# Patient Record
Sex: FEMALE | Race: WHITE | NOT HISPANIC OR LATINO | Employment: UNEMPLOYED | URBAN - NONMETROPOLITAN AREA
[De-identification: names, ages, dates, MRNs, and addresses within clinical notes are randomized per-mention and may not be internally consistent; named-entity substitution may affect disease eponyms.]

---

## 2018-02-05 ENCOUNTER — HOSPITAL ENCOUNTER (EMERGENCY)
Facility: HOSPITAL | Age: 23
Discharge: HOME | End: 2018-02-05
Attending: EMERGENCY MEDICINE | Admitting: EMERGENCY MEDICINE

## 2018-02-05 VITALS
HEART RATE: 112 BPM | BODY MASS INDEX: 37.67 KG/M2 | OXYGEN SATURATION: 99 % | SYSTOLIC BLOOD PRESSURE: 120 MMHG | TEMPERATURE: 98.9 F | WEIGHT: 240 LBS | RESPIRATION RATE: 18 BRPM | HEIGHT: 67 IN | DIASTOLIC BLOOD PRESSURE: 60 MMHG

## 2018-02-05 DIAGNOSIS — J10.1 INFLUENZA A: Primary | ICD-10-CM

## 2018-02-05 LAB
FLUAV AG NPH QL: POSITIVE
FLUBV AG NPH QL IA: NEGATIVE
S PYO AG THROAT QL: NEGATIVE

## 2018-02-05 PROCEDURE — 87880 STREP A ASSAY W/OPTIC: CPT | Performed by: EMERGENCY MEDICINE

## 2018-02-05 PROCEDURE — 87081 CULTURE SCREEN ONLY: CPT | Performed by: EMERGENCY MEDICINE

## 2018-02-05 PROCEDURE — 99283 EMERGENCY DEPT VISIT LOW MDM: CPT

## 2018-02-05 PROCEDURE — 87804 INFLUENZA ASSAY W/OPTIC: CPT | Performed by: EMERGENCY MEDICINE

## 2018-02-05 PROCEDURE — 63710000001 PROMETHAZINE PER 25 MG: Performed by: PHYSICIAN ASSISTANT

## 2018-02-05 RX ORDER — PROMETHAZINE HYDROCHLORIDE 25 MG/1
25 TABLET ORAL ONCE
Status: COMPLETED | OUTPATIENT
Start: 2018-02-05 | End: 2018-02-05

## 2018-02-05 RX ORDER — PROMETHAZINE HYDROCHLORIDE 12.5 MG/1
12.5 TABLET ORAL EVERY 8 HOURS PRN
Qty: 21 TABLET | Refills: 0 | Status: SHIPPED | OUTPATIENT
Start: 2018-02-05 | End: 2018-02-12

## 2018-02-05 RX ADMIN — PROMETHAZINE HYDROCHLORIDE 25 MG: 25 TABLET ORAL at 22:43

## 2018-02-06 NOTE — ED PROVIDER NOTES
Subjective   Patient is a 22 y.o. female presenting with flu symptoms.   Flu Symptoms   Presenting symptoms: cough, fatigue, fever, headache, myalgias and sore throat    Presenting symptoms: no diarrhea, no nausea, no rhinorrhea, no shortness of breath and no vomiting    Severity:  Moderate  Onset quality:  Gradual  Duration:  3 days  Progression:  Worsening  Chronicity:  New  Relieved by:  None tried  Worsened by:  Nothing  Ineffective treatments:  None tried  Associated symptoms: chills and decreased appetite    Associated symptoms: no decrease in physical activity, no ear pain, no mental status change, no congestion, no neck stiffness and no syncope    Risk factors: not elderly, no diabetes problem, no heart disease, no immunocompromised state, no kidney disease, no liver disease, not pregnant and no sick contacts        Review of Systems   Constitutional: Positive for chills, decreased appetite, fatigue and fever. Negative for activity change, appetite change, diaphoresis and unexpected weight change.   HENT: Positive for sore throat. Negative for congestion, dental problem, drooling, ear discharge, ear pain, facial swelling, hearing loss, mouth sores, nosebleeds, postnasal drip, rhinorrhea, sinus pain, sinus pressure, sneezing, tinnitus, trouble swallowing and voice change.    Respiratory: Positive for cough. Negative for shortness of breath.    Cardiovascular: Negative.  Negative for chest pain.   Gastrointestinal: Negative.  Negative for abdominal pain, diarrhea, nausea and vomiting.   Endocrine: Negative.    Genitourinary: Negative.  Negative for dysuria.   Musculoskeletal: Positive for myalgias. Negative for arthralgias, back pain, gait problem, joint swelling, neck pain and neck stiffness.   Skin: Negative.    Neurological: Positive for headaches. Negative for dizziness, tremors, seizures, syncope, facial asymmetry, speech difficulty, weakness, light-headedness and numbness.   Psychiatric/Behavioral:  Negative.    All other systems reviewed and are negative.      History reviewed. No pertinent past medical history.    No Known Allergies    History reviewed. No pertinent surgical history.    History reviewed. No pertinent family history.    Social History     Social History   • Marital status: Single     Spouse name: N/A   • Number of children: N/A   • Years of education: N/A     Social History Main Topics   • Smoking status: Never Smoker   • Smokeless tobacco: None   • Alcohol use None   • Drug use: None   • Sexual activity: Not Asked     Other Topics Concern   • None     Social History Narrative   • None           Objective   Physical Exam   Constitutional: She is oriented to person, place, and time. She appears well-developed and well-nourished. No distress.   HENT:   Head: Normocephalic and atraumatic.   Right Ear: Hearing, tympanic membrane, external ear and ear canal normal.   Left Ear: Hearing, tympanic membrane, external ear and ear canal normal.   Nose: Nose normal.   Mouth/Throat: Oropharynx is clear and moist. No oropharyngeal exudate.   Eyes: Conjunctivae and EOM are normal. Pupils are equal, round, and reactive to light.   Neck: Normal range of motion. Neck supple. No JVD present. No tracheal deviation present.   Cardiovascular: Normal rate, regular rhythm and normal heart sounds.    No murmur heard.  Pulmonary/Chest: Effort normal and breath sounds normal. No respiratory distress. She has no wheezes.   Abdominal: Soft. Bowel sounds are normal. There is no tenderness.   Musculoskeletal: Normal range of motion. She exhibits no edema or deformity.   Neurological: She is alert and oriented to person, place, and time. No cranial nerve deficit.   Skin: Skin is warm and dry. No rash noted. She is not diaphoretic. No erythema. No pallor.   Psychiatric: She has a normal mood and affect. Her behavior is normal. Thought content normal.   Nursing note and vitals reviewed.      Procedures         ED Course  ED  Course   Comment By Time   Patient diagnosed with influenza A. Please increase fluid intake and use phenergan for nausea. Will f/u with PCP in 3 days. Will return to ER if symptoms worsen. AMADOU Mena 02/05 [id number]                  MDM  Number of Diagnoses or Management Options  Influenza A:      Amount and/or Complexity of Data Reviewed  Clinical lab tests: ordered and reviewed        Final diagnoses:   Influenza A            AMADOU Mena  02/06/18 0231

## 2018-02-06 NOTE — DISCHARGE INSTRUCTIONS
Please increase fluid intake and use tylenol and ibuprofen as needed. Use phenergan as needed for nausea. Please see PCP In 3 days. Please return to ER if symptoms worsen.   Influenza, Adult  Influenza, more commonly known as “the flu,” is a viral infection that primarily affects the respiratory tract. The respiratory tract includes organs that help you breathe, such as the lungs, nose, and throat. The flu causes many common cold symptoms, as well as a high fever and body aches.  The flu spreads easily from person to person (is contagious). Getting a flu shot (influenza vaccination) every year is the best way to prevent influenza.  What are the causes?  Influenza is caused by a virus. You can catch the virus by:  · Breathing in droplets from an infected person's cough or sneeze.  · Touching something that was recently contaminated with the virus and then touching your mouth, nose, or eyes.  What increases the risk?  The following factors may make you more likely to get the flu:  · Not cleaning your hands frequently with soap and water or alcohol-based hand .  · Having close contact with many people during cold and flu season.  · Touching your mouth, eyes, or nose without washing or sanitizing your hands first.  · Not drinking enough fluids or not eating a healthy diet.  · Not getting enough sleep or exercise.  · Being under a high amount of stress.  · Not getting a yearly (annual) flu shot.  You may be at a higher risk of complications from the flu, such as a severe lung infection (pneumonia), if you:  · Are over the age of 65.  · Are pregnant.  · Have a weakened disease-fighting system (immune system). You may have a weakened immune system if you:  ¨ Have HIV or AIDS.  ¨ Are undergoing chemotherapy.  ¨ Are taking medicines that reduce the activity of (suppress) the immune system.  · Have a long-term (chronic) illness, such as heart disease, kidney disease, diabetes, or lung disease.  · Have a liver  disorder.  · Are obese.  · Have anemia.  What are the signs or symptoms?  Symptoms of this condition typically last 4-10 days and may include:  · Fever.  · Chills.  · Headache, body aches, or muscle aches.  · Sore throat.  · Cough.  · Runny or congested nose.  · Chest discomfort and cough.  · Poor appetite.  · Weakness or tiredness (fatigue).  · Dizziness.  · Nausea or vomiting.  How is this diagnosed?  This condition may be diagnosed based on your medical history and a physical exam. Your health care provider may do a nose or throat swab test to confirm the diagnosis.  How is this treated?  If influenza is detected early, you can be treated with antiviral medicine that can reduce the length of your illness and the severity of your symptoms. This medicine may be given by mouth (orally) or through an IV tube that is inserted in one of your veins.  The goal of treatment is to relieve symptoms by taking care of yourself at home. This may include taking over-the-counter medicines, drinking plenty of fluids, and adding humidity to the air in your home.  In some cases, influenza goes away on its own. Severe influenza or complications from influenza may be treated in a hospital.  Follow these instructions at home:  · Take over-the-counter and prescription medicines only as told by your health care provider.  · Use a cool mist humidifier to add humidity to the air in your home. This can make breathing easier.  · Rest as needed.  · Drink enough fluid to keep your urine clear or pale yellow.  · Cover your mouth and nose when you cough or sneeze.  · Wash your hands with soap and water often, especially after you cough or sneeze. If soap and water are not available, use hand .  · Stay home from work or school as told by your health care provider. Unless you are visiting your health care provider, try to avoid leaving home until your fever has been gone for 24 hours without the use of medicine.  · Keep all follow-up  visits as told by your health care provider. This is important.  How is this prevented?  · Getting an annual flu shot is the best way to avoid getting the flu. You may get the flu shot in late summer, fall, or winter. Ask your health care provider when you should get your flu shot.  · Wash your hands often or use hand  often.  · Avoid contact with people who are sick during cold and flu season.  · Eat a healthy diet, drink plenty of fluids, get enough sleep, and exercise regularly.  Contact a health care provider if:  · You develop new symptoms.  · You have:  ¨ Chest pain.  ¨ Diarrhea.  ¨ A fever.  · Your cough gets worse.  · You produce more mucus.  · You feel nauseous or you vomit.  Get help right away if:  · You develop shortness of breath or difficulty breathing.  · Your skin or nails turn a bluish color.  · You have severe pain or stiffness in your neck.  · You develop a sudden headache or sudden pain in your face or ear.  · You cannot stop vomiting.  This information is not intended to replace advice given to you by your health care provider. Make sure you discuss any questions you have with your health care provider.  Document Released: 12/15/2001 Document Revised: 05/25/2017 Document Reviewed: 10/11/2016  [employer] Interactive Patient Education © 2017 [employer]      Hypertension  Hypertension, commonly called high blood pressure, is when the force of blood pumping through your arteries is too strong. Your arteries are the blood vessels that carry blood from your heart throughout your body. A blood pressure reading consists of a higher number over a lower number, such as 110/72. The higher number (systolic) is the pressure inside your arteries when your heart pumps. The lower number (diastolic) is the pressure inside your arteries when your heart relaxes. Ideally you want your blood pressure below 120/80.  Hypertension forces your heart to work harder to pump blood. Your arteries may become narrow  or stiff. Having untreated or uncontrolled hypertension can cause heart attack, stroke, kidney disease, and other problems.  What increases the risk?  Some risk factors for high blood pressure are controllable. Others are not.  Risk factors you cannot control include:  · Race. You may be at higher risk if you are .  · Age. Risk increases with age.  · Gender. Men are at higher risk than women before age 45 years. After age 65, women are at higher risk than men.  Risk factors you can control include:  · Not getting enough exercise or physical activity.  · Being overweight.  · Getting too much fat, sugar, calories, or salt in your diet.  · Drinking too much alcohol.  What are the signs or symptoms?  Hypertension does not usually cause signs or symptoms. Extremely high blood pressure (hypertensive crisis) may cause headache, anxiety, shortness of breath, and nosebleed.  How is this diagnosed?  To check if you have hypertension, your health care provider will measure your blood pressure while you are seated, with your arm held at the level of your heart. It should be measured at least twice using the same arm. Certain conditions can cause a difference in blood pressure between your right and left arms. A blood pressure reading that is higher than normal on one occasion does not mean that you need treatment. If it is not clear whether you have high blood pressure, you may be asked to return on a different day to have your blood pressure checked again. Or, you may be asked to monitor your blood pressure at home for 1 or more weeks.  How is this treated?  Treating high blood pressure includes making lifestyle changes and possibly taking medicine. Living a healthy lifestyle can help lower high blood pressure. You may need to change some of your habits.  Lifestyle changes may include:  · Following the DASH diet. This diet is high in fruits, vegetables, and whole grains. It is low in salt, red meat, and added  sugars.  · Keep your sodium intake below 2,300 mg per day.  · Getting at least 30-45 minutes of aerobic exercise at least 4 times per week.  · Losing weight if necessary.  · Not smoking.  · Limiting alcoholic beverages.  · Learning ways to reduce stress.  Your health care provider may prescribe medicine if lifestyle changes are not enough to get your blood pressure under control, and if one of the following is true:  · You are 18-59 years of age and your systolic blood pressure is above 140.  · You are 60 years of age or older, and your systolic blood pressure is above 150.  · Your diastolic blood pressure is above 90.  · You have diabetes, and your systolic blood pressure is over 140 or your diastolic blood pressure is over 90.  · You have kidney disease and your blood pressure is above 140/90.  · You have heart disease and your blood pressure is above 140/90.  Your personal target blood pressure may vary depending on your medical conditions, your age, and other factors.  Follow these instructions at home:  · Have your blood pressure rechecked as directed by your health care provider.  · Take medicines only as directed by your health care provider. Follow the directions carefully. Blood pressure medicines must be taken as prescribed. The medicine does not work as well when you skip doses. Skipping doses also puts you at risk for problems.  · Do not smoke.  · Monitor your blood pressure at home as directed by your health care provider.  Contact a health care provider if:  · You think you are having a reaction to medicines taken.  · You have recurrent headaches or feel dizzy.  · You have swelling in your ankles.  · You have trouble with your vision.  Get help right away if:  · You develop a severe headache or confusion.  · You have unusual weakness, numbness, or feel faint.  · You have severe chest or abdominal pain.  · You vomit repeatedly.  · You have trouble breathing.  This information is not intended to replace  advice given to you by your health care provider. Make sure you discuss any questions you have with your health care provider.  Document Released: 12/18/2006 Document Revised: 05/25/2017 Document Reviewed: 10/10/2014  [employer] Interactive Patient Education © 2017 [employer]

## 2018-02-07 LAB — BACTERIA SPEC AEROBE CULT: NORMAL

## 2020-06-04 ENCOUNTER — APPOINTMENT (OUTPATIENT)
Dept: ULTRASOUND IMAGING | Facility: HOSPITAL | Age: 25
End: 2020-06-04

## 2020-06-04 ENCOUNTER — HOSPITAL ENCOUNTER (INPATIENT)
Facility: HOSPITAL | Age: 25
LOS: 2 days | Discharge: HOME | End: 2020-06-06
Attending: EMERGENCY MEDICINE | Admitting: OBSTETRICS & GYNECOLOGY

## 2020-06-04 DIAGNOSIS — A49.9 UTI (URINARY TRACT INFECTION), BACTERIAL: Primary | ICD-10-CM

## 2020-06-04 DIAGNOSIS — Z3A.08 8 WEEKS GESTATION OF PREGNANCY: ICD-10-CM

## 2020-06-04 DIAGNOSIS — E86.0 DEHYDRATION: ICD-10-CM

## 2020-06-04 DIAGNOSIS — R11.2 INTRACTABLE VOMITING WITH NAUSEA, UNSPECIFIED VOMITING TYPE: ICD-10-CM

## 2020-06-04 DIAGNOSIS — N39.0 UTI (URINARY TRACT INFECTION), BACTERIAL: Primary | ICD-10-CM

## 2020-06-04 PROBLEM — O23.41 URINARY TRACT INFECTION IN MOTHER DURING FIRST TRIMESTER OF PREGNANCY: Status: ACTIVE | Noted: 2020-06-04

## 2020-06-04 PROBLEM — O21.0 MILD HYPEREMESIS GRAVIDARUM: Status: ACTIVE | Noted: 2020-06-04

## 2020-06-04 LAB
ALBUMIN SERPL-MCNC: 4.14 G/DL (ref 3.5–5.2)
ALBUMIN/GLOB SERPL: 1.1 G/DL
ALP SERPL-CCNC: 93 U/L (ref 39–117)
ALT SERPL W P-5'-P-CCNC: 74 U/L (ref 1–33)
AMYLASE SERPL-CCNC: 41 U/L (ref 28–100)
ANION GAP SERPL CALCULATED.3IONS-SCNC: 11.6 MMOL/L (ref 5–15)
AST SERPL-CCNC: 48 U/L (ref 1–32)
BACTERIA UR QL AUTO: ABNORMAL /HPF
BASOPHILS # BLD AUTO: 0.03 10*3/MM3 (ref 0–0.2)
BASOPHILS NFR BLD AUTO: 0.2 % (ref 0–1.5)
BILIRUB SERPL-MCNC: 0.7 MG/DL (ref 0.2–1.2)
BILIRUB UR QL STRIP: ABNORMAL
BUN BLD-MCNC: 10 MG/DL (ref 6–20)
BUN/CREAT SERPL: 16.1 (ref 7–25)
CALCIUM SPEC-SCNC: 9.9 MG/DL (ref 8.6–10.5)
CHLORIDE SERPL-SCNC: 103 MMOL/L (ref 98–107)
CLARITY UR: ABNORMAL
CO2 SERPL-SCNC: 22.4 MMOL/L (ref 22–29)
COLOR UR: ABNORMAL
CREAT BLD-MCNC: 0.62 MG/DL (ref 0.57–1)
D-LACTATE SERPL-SCNC: 1 MMOL/L (ref 0.5–2)
DEPRECATED RDW RBC AUTO: 41.7 FL (ref 37–54)
EOSINOPHIL # BLD AUTO: 0.03 10*3/MM3 (ref 0–0.4)
EOSINOPHIL NFR BLD AUTO: 0.2 % (ref 0.3–6.2)
ERYTHROCYTE [DISTWIDTH] IN BLOOD BY AUTOMATED COUNT: 13.4 % (ref 12.3–15.4)
GFR SERPL CREATININE-BSD FRML MDRD: 119 ML/MIN/1.73
GLOBULIN UR ELPH-MCNC: 3.8 GM/DL
GLUCOSE BLD-MCNC: 98 MG/DL (ref 65–99)
GLUCOSE UR STRIP-MCNC: NEGATIVE MG/DL
HCG INTACT+B SERPL-ACNC: NORMAL MIU/ML
HCT VFR BLD AUTO: 43.9 % (ref 34–46.6)
HGB BLD-MCNC: 14.2 G/DL (ref 12–15.9)
HGB UR QL STRIP.AUTO: NEGATIVE
HYALINE CASTS UR QL AUTO: ABNORMAL /LPF
IMM GRANULOCYTES # BLD AUTO: 0.05 10*3/MM3 (ref 0–0.05)
IMM GRANULOCYTES NFR BLD AUTO: 0.4 % (ref 0–0.5)
KETONES UR QL STRIP: ABNORMAL
LEUKOCYTE ESTERASE UR QL STRIP.AUTO: ABNORMAL
LIPASE SERPL-CCNC: 35 U/L (ref 13–60)
LYMPHOCYTES # BLD AUTO: 1.8 10*3/MM3 (ref 0.7–3.1)
LYMPHOCYTES NFR BLD AUTO: 13.8 % (ref 19.6–45.3)
MCH RBC QN AUTO: 28 PG (ref 26.6–33)
MCHC RBC AUTO-ENTMCNC: 32.3 G/DL (ref 31.5–35.7)
MCV RBC AUTO: 86.6 FL (ref 79–97)
MONOCYTES # BLD AUTO: 0.69 10*3/MM3 (ref 0.1–0.9)
MONOCYTES NFR BLD AUTO: 5.3 % (ref 5–12)
NEUTROPHILS # BLD AUTO: 10.43 10*3/MM3 (ref 1.7–7)
NEUTROPHILS NFR BLD AUTO: 80.1 % (ref 42.7–76)
NITRITE UR QL STRIP: POSITIVE
NRBC BLD AUTO-RTO: 0 /100 WBC (ref 0–0.2)
PH UR STRIP.AUTO: 6 [PH] (ref 5–8)
PLATELET # BLD AUTO: 291 10*3/MM3 (ref 140–450)
PMV BLD AUTO: 9.6 FL (ref 6–12)
POTASSIUM BLD-SCNC: 3.6 MMOL/L (ref 3.5–5.2)
PROT SERPL-MCNC: 7.9 G/DL (ref 6–8.5)
PROT UR QL STRIP: ABNORMAL
RBC # BLD AUTO: 5.07 10*6/MM3 (ref 3.77–5.28)
RBC # UR: ABNORMAL /HPF
REF LAB TEST METHOD: ABNORMAL
SODIUM BLD-SCNC: 137 MMOL/L (ref 136–145)
SP GR UR STRIP: >1.03 (ref 1–1.03)
SQUAMOUS #/AREA URNS HPF: ABNORMAL /HPF
UROBILINOGEN UR QL STRIP: ABNORMAL
WBC NRBC COR # BLD: 13.03 10*3/MM3 (ref 3.4–10.8)
WBC UR QL AUTO: ABNORMAL /HPF
WHOLE BLOOD HOLD SPECIMEN: NORMAL

## 2020-06-04 PROCEDURE — 76801 OB US < 14 WKS SINGLE FETUS: CPT

## 2020-06-04 PROCEDURE — 25010000002 PROMETHAZINE PER 50 MG: Performed by: EMERGENCY MEDICINE

## 2020-06-04 PROCEDURE — 76801 OB US < 14 WKS SINGLE FETUS: CPT | Performed by: RADIOLOGY

## 2020-06-04 PROCEDURE — 83690 ASSAY OF LIPASE: CPT | Performed by: EMERGENCY MEDICINE

## 2020-06-04 PROCEDURE — 84702 CHORIONIC GONADOTROPIN TEST: CPT | Performed by: EMERGENCY MEDICINE

## 2020-06-04 PROCEDURE — 82150 ASSAY OF AMYLASE: CPT | Performed by: EMERGENCY MEDICINE

## 2020-06-04 PROCEDURE — 25010000002 PROMETHAZINE PER 50 MG: Performed by: OBSTETRICS & GYNECOLOGY

## 2020-06-04 PROCEDURE — 99284 EMERGENCY DEPT VISIT MOD MDM: CPT

## 2020-06-04 PROCEDURE — 83605 ASSAY OF LACTIC ACID: CPT | Performed by: EMERGENCY MEDICINE

## 2020-06-04 PROCEDURE — 85025 COMPLETE CBC W/AUTO DIFF WBC: CPT | Performed by: EMERGENCY MEDICINE

## 2020-06-04 PROCEDURE — 25010000002 CEFTRIAXONE PER 250 MG: Performed by: EMERGENCY MEDICINE

## 2020-06-04 PROCEDURE — 36415 COLL VENOUS BLD VENIPUNCTURE: CPT

## 2020-06-04 PROCEDURE — 87040 BLOOD CULTURE FOR BACTERIA: CPT | Performed by: EMERGENCY MEDICINE

## 2020-06-04 PROCEDURE — 81001 URINALYSIS AUTO W/SCOPE: CPT | Performed by: EMERGENCY MEDICINE

## 2020-06-04 PROCEDURE — 80053 COMPREHEN METABOLIC PANEL: CPT | Performed by: EMERGENCY MEDICINE

## 2020-06-04 RX ORDER — NITROFURANTOIN 25; 75 MG/1; MG/1
100 CAPSULE ORAL 2 TIMES DAILY
COMMUNITY
End: 2020-06-06 | Stop reason: HOSPADM

## 2020-06-04 RX ORDER — LANOLIN ALCOHOL/MO/W.PET/CERES
25 CREAM (GRAM) TOPICAL 3 TIMES DAILY PRN
Status: DISCONTINUED | OUTPATIENT
Start: 2020-06-04 | End: 2020-06-06 | Stop reason: HOSPADM

## 2020-06-04 RX ORDER — PROMETHAZINE HYDROCHLORIDE 25 MG/1
25 TABLET ORAL EVERY 4 HOURS PRN
Status: CANCELLED | OUTPATIENT
Start: 2020-06-04

## 2020-06-04 RX ORDER — PYRIDOXINE HCL (VITAMIN B6) 25 MG
25 TABLET ORAL 3 TIMES DAILY PRN
Status: ON HOLD | COMMUNITY
End: 2020-11-19

## 2020-06-04 RX ORDER — NITROFURANTOIN 25; 75 MG/1; MG/1
100 CAPSULE ORAL EVERY 12 HOURS SCHEDULED
Status: CANCELLED | OUTPATIENT
Start: 2020-06-04 | End: 2020-06-09

## 2020-06-04 RX ORDER — SODIUM CHLORIDE 9 MG/ML
125 INJECTION, SOLUTION INTRAVENOUS CONTINUOUS
Status: DISCONTINUED | OUTPATIENT
Start: 2020-06-04 | End: 2020-06-06 | Stop reason: HOSPADM

## 2020-06-04 RX ORDER — PROMETHAZINE HYDROCHLORIDE 25 MG/1
25 TABLET ORAL EVERY 4 HOURS PRN
Status: ON HOLD | COMMUNITY
End: 2020-11-19

## 2020-06-04 RX ADMIN — PROMETHAZINE HYDROCHLORIDE 12.5 MG: 25 INJECTION INTRAMUSCULAR; INTRAVENOUS at 20:28

## 2020-06-04 RX ADMIN — SODIUM CHLORIDE 2000 ML: 9 INJECTION, SOLUTION INTRAVENOUS at 12:02

## 2020-06-04 RX ADMIN — SODIUM CHLORIDE 125 ML/HR: 9 INJECTION, SOLUTION INTRAVENOUS at 12:04

## 2020-06-04 RX ADMIN — SODIUM CHLORIDE 125 ML/HR: 9 INJECTION, SOLUTION INTRAVENOUS at 17:53

## 2020-06-04 RX ADMIN — PROMETHAZINE HYDROCHLORIDE 12.5 MG: 25 INJECTION INTRAMUSCULAR; INTRAVENOUS at 12:03

## 2020-06-04 RX ADMIN — SODIUM CHLORIDE 2 G: 900 INJECTION INTRAVENOUS at 15:16

## 2020-06-04 NOTE — H&P
Chief complaint   Chief Complaint   Patient presents with   • Abdominal Pain       History of Present Illness: Patient is a 25 y.o. female who presents with IUP at 8 weeks 4 days gestation. G2 P 1. Hyperemesis. UTI.        Past Medical History:   Diagnosis Date   • Hypertension     At age 18   • Kidney stone    • Urinary tract infection      Blood Type: A Rh Positive     Past Surgical History:   Procedure Laterality Date   • KIDNEY STONE SURGERY  2014    Kidney stone removal   • LAPAROSCOPIC CHOLECYSTECTOMY     • TONSILLECTOMY       Family History   Problem Relation Age of Onset   • Ovarian cancer Paternal Grandmother    • Hypertension Maternal Grandmother    • Breast cancer Paternal Aunt      Social History     Tobacco Use   • Smoking status: Never Smoker   • Smokeless tobacco: Never Used   Substance Use Topics   • Alcohol use: Never     Frequency: Never   • Drug use: Never     Medications Prior to Admission   Medication Sig Dispense Refill Last Dose   • doxylamine ([employer]) 25 MG tablet Take 25 mg by mouth At Night As Needed for Sleep.   6/3/2020 at Unknown time   • nitrofurantoin, macrocrystal-monohydrate, (MACROBID) 100 MG capsule Take 100 mg by mouth 2 (Two) Times a Day. Prior to [facility] Admission, Patient was on: for 7 days, 9 capsules left. For UTI   6/4/2020 at Unknown time   • promethazine (PHENERGAN) 25 MG tablet Take 25 mg by mouth Every 4 (Four) Hours As Needed for Nausea or Vomiting.   6/4/2020 at Unknown time   • pyridoxine (VITAMIN B-6) 25 MG tablet Take 25 mg by mouth 3 (Three) Times a Day As Needed.   6/4/2020 at Unknown time     Allergies:  Patient has no known allergies.      Vital Signs  Temp:  [98.1 °F (36.7 °C)-98.2 °F (36.8 °C)] 98.1 °F (36.7 °C)  Heart Rate:  [] 76  Resp:  [18-20] 18  BP: (103-147)/(57-84) 147/79    Radiology  Imaging Results (Last 24 Hours)     Procedure Component Value Units Date/Time     Ob < 14 Weeks Single or First Gestation [[id number]] Collected:  06/04/20  1211     Updated:  06/04/20 1215    Narrative:       EXAMINATION: US OB < 14 WEEKS SINGLE OR FIRST GESTATION-      CLINICAL INDICATION: pregnant/lower abd pain        COMPARISON: None available     FINDINGS: Transabdominal sonographic imaging of the pelvis shows a  gestational sac and crown-rump length.     Heart rate was calculated at 166 bpm     Estimated age based on crown-rump length measurement was 8 weeks and 4  days.     Corpus luteum cyst in the left adnexa       Impression:       Appearance suggestive of single living intrauterine  pregnancy 8 weeks 4 days      This report was finalized on 6/4/2020 12:12 PM by Dr. John Valenzuela MD.             Labs  Lab Results (last 24 hours)     Procedure Component Value Units Date/Time    Blood Culture - Blood, Arm, Right [[id number]] Collected:  06/04/20 1431    Specimen:  Blood from Arm, Right Updated:  06/04/20 1435    Lactic Acid, Plasma [[id number]]  (Normal) Collected:  06/04/20 1347    Specimen:  Blood from Arm, Right Updated:  06/04/20 1412     Lactate 1.0 mmol/L     Blood Culture - Blood, Arm, Right [[id number]] Collected:  06/04/20 1347    Specimen:  Blood from Arm, Right Updated:  06/04/20 1355    Urinalysis, Microscopic Only - Urine, Clean Catch [[id number]]  (Abnormal) Collected:  06/04/20 1251    Specimen:  Urine, Clean Catch Updated:  06/04/20 1324     RBC, UA 0-2 /HPF      WBC, UA Too Numerous to Count /HPF      Bacteria, UA 2+ /HPF      Squamous Epithelial Cells, UA 13-20 /HPF      Hyaline Casts, UA 13-20 /LPF      Methodology Manual Light Microscopy    Urinalysis With Microscopic If Indicated (No Culture) - Urine, Clean Catch [[id number]]  (Abnormal) Collected:  06/04/20 1251    Specimen:  Urine, Clean Catch Updated:  06/04/20 1310     Color, UA Dark Yellow     Appearance, UA Cloudy     pH, UA 6.0     Specific Gravity, UA >1.030     Glucose, UA Negative     Ketones, UA >=160 mg/dL (4+)     Bilirubin, UA Small (1+)     Blood, UA Negative     Protein, UA 30  mg/dL (1+)     Leuk Esterase, UA Large (3+)     Nitrite, UA Positive     Urobilinogen, UA 1.0 E.U./dL    [place] Draw [[id number]] Collected:  06/04/20 1144    Specimen:  Blood from Arm, Right Updated:  06/04/20 1245    Narrative:       The following orders were created for panel order [place] Draw.  Procedure                               Abnormality         Status                     ---------                               -----------         ------                     Light Blue Top[[id number]]                                   Final result                 Please view results for these tests on the individual orders.    Light Blue Top [[id number]] Collected:  06/04/20 1144    Specimen:  Blood from Arm, Right Updated:  06/04/20 1245     Extra Tube hold for add-on     Comment: Auto resulted       hCG, Quantitative, Pregnancy [[id number]] Collected:  06/04/20 1144    Specimen:  Blood from Arm, Left Updated:  06/04/20 1241     HCG Quantitative 80,598.00 mIU/mL     Narrative:       HCG Ranges by Gestational Age    Females - non-pregnant premenopausal   </= 1mIU/mL HCG  Females - postmenopausal               </= 7mIU/mL HCG    3 Weeks         5.8 -    71.2 mIU/mL  4 Weeks         9.5 -     750 mIU/mL  5 Weeks         217 -   7,138 mIU/mL  6 Weeks         158 -  31,795 mIU/mL  7 Weeks       3,697 - 163,563 mIU/mL  8 Weeks      32,065 - 149,571 mIU/mL  9 Weeks      63,803 - 151,410 mIU/mL  10 Weeks     46,509 - 186,977 mIU/mL  12 Weeks     27,832 - 210,612 mIU/mL  14 Weeks     13,950 -  62,530 mIU/mL  15 Weeks     12,039 -  70,971 mIU/mL  16 Weeks      9,040 -  56,451 mIU/mL  17 Weeks      8,175 -  55,868 mIU/mL  18 Weeks      8,099 -  58,176 mIU/mL  Results may be falsely decreased if patient taking Biotin.      Comprehensive Metabolic Panel [[id number]]  (Abnormal) Collected:  06/04/20 1144    Specimen:  Blood from Arm, Left Updated:  06/04/20 1212     Glucose 98 mg/dL      BUN 10 mg/dL      Creatinine 0.62 mg/dL       Sodium 137 mmol/L      Potassium 3.6 mmol/L      Chloride 103 mmol/L      CO2 22.4 mmol/L      Calcium 9.9 mg/dL      Total Protein 7.9 g/dL      Albumin 4.14 g/dL      ALT (SGPT) 74 U/L      AST (SGOT) 48 U/L      Alkaline Phosphatase 93 U/L      Total Bilirubin 0.7 mg/dL      eGFR Non African Amer 119 mL/min/1.73      Globulin 3.8 gm/dL      A/G Ratio 1.1 g/dL      BUN/Creatinine Ratio 16.1     Anion Gap 11.6 mmol/L     Narrative:       GFR Normal >60  Chronic Kidney Disease <60  Kidney Failure <15      Lipase [[id number]]  (Normal) Collected:  06/04/20 1144    Specimen:  Blood from Arm, Left Updated:  06/04/20 1212     Lipase 35 U/L     Amylase [[id number]]  (Normal) Collected:  06/04/20 1144    Specimen:  Blood from Arm, Left Updated:  06/04/20 1212     Amylase 41 U/L     CBC & Differential [[id number]] Collected:  06/04/20 1144    Specimen:  Blood from Arm, Left Updated:  06/04/20 1150    Narrative:       The following orders were created for panel order CBC & Differential.  Procedure                               Abnormality         Status                     ---------                               -----------         ------                     CBC Auto Differential[[id number]]        Abnormal            Final result                 Please view results for these tests on the individual orders.    CBC Auto Differential [[id number]]  (Abnormal) Collected:  06/04/20 1144    Specimen:  Blood from Arm, Left Updated:  06/04/20 1150     WBC 13.03 10*3/mm3      RBC 5.07 10*6/mm3      Hemoglobin 14.2 g/dL      Hematocrit 43.9 %      MCV 86.6 fL      MCH 28.0 pg      MCHC 32.3 g/dL      RDW 13.4 %      RDW-SD 41.7 fl      MPV 9.6 fL      Platelets 291 10*3/mm3      Neutrophil % 80.1 %      Lymphocyte % 13.8 %      Monocyte % 5.3 %      Eosinophil % 0.2 %      Basophil % 0.2 %      Immature Grans % 0.4 %      Neutrophils, Absolute 10.43 10*3/mm3      Lymphocytes, Absolute 1.80 10*3/mm3      Monocytes, Absolute 0.69 10*3/mm3         Eosinophils, Absolute 0.03 10*3/mm3      Basophils, Absolute 0.03 10*3/mm3      Immature Grans, Absolute 0.05 10*3/mm3      nRBC 0.0 /100 WBC             Review of Systems    The following systems were reviewed and negative;  ENT, respiratory, cardiovascular, breast, endocrine and allergies / immunologic.      Physical Exam:      General Appearance:    Alert, cooperative, in no acute distress   Head:    Normocephalic, without obvious abnormality, atraumatic   Eyes:            Lids and lashes normal, conjunctivae and sclerae normal, no   icterus, no pallor, corneas clear, PERRLA   Ears:    Ears appear intact with no abnormalities noted   Throat:   No oral lesions, no thrush, oral mucosa moist   Neck:   No adenopathy, supple, trachea midline, no thyromegaly, no     carotid bruit, no JVD   Back:     No kyphosis present, no scoliosis present, no skin lesions,       erythema or scars, no tenderness to percussion or                   palpation,   range of motion normal   Lungs:     Clear to auscultation,respirations regular, even and                   unlabored    Heart:    Regular rhythm and normal rate, normal S1 and S2, no            murmur, no gallop, no rub, no click   Breast Exam:    Deferred   Abdomen:     Normal bowel sounds, no masses, no organomegaly, soft        non-tender, non-distended, no guarding, no rebound                 tenderness   Genitalia:    Deferred   Extremities:   Moves all extremities well, no edema, no cyanosis, no              redness   Pulses:   Pulses palpable and equal bilaterally   Skin:   No bleeding, bruising or rash   Lymph nodes:   No palpable adenopathy   Neurologic:   Cranial nerves 2 - 12 grossly intact, sensation intact, DTR        present and equal bilaterally         Assessment: Patient is a 25 y.o. female who presents with IUP at 8 weeks 4 days gestation. G 2 P1. UTI Hyperemesis.  Chief Complaint   Patient presents with   • Abdominal Pain       Plan of Care: Admit. Proceed  with IV fluids. IV antibiotics.       Hua Asencio III, MD  06/04/20  18:05

## 2020-06-04 NOTE — PLAN OF CARE
Problem: Patient Care Overview  Goal: Plan of Care Review  Outcome: Ongoing (interventions implemented as appropriate)  Flowsheets (Taken 6/4/2020 1612)  Progress: improving  Plan of Care Reviewed With: patient  Outcome Summary: The patient is tolerating Gatorade and ice chips. IV infusing well. No complaints at this time.

## 2020-06-04 NOTE — ED NOTES
Called [facility] to fax over US report. Mera will work on it and call us back.      Rene Jeffery  06/04/20 1133

## 2020-06-04 NOTE — ED NOTES
Patient is attempting to provide a urine sample at this time.      Symes, Heather  06/04/20 8984

## 2020-06-04 NOTE — ED PROVIDER NOTES
Subjective   Patient is a 25-year-old female G2, P1, states 8 weeks pregnant.  She reports that last Thursday, 8 days ago, she developed nausea and vomiting that has been intractable.  She states that she has been unable to keep anything down, not even her medications.  She reports only an occasional mild twinge of lower abdominal discomfort, no sustained pain of any sort, no vaginal bleeding.  She denies fever, chills, headaches, chest pain, shortness of breath, back pain, syncope or near syncope, any other symptoms or complaints.  She states that she went to [facility] on Saturday, received IV fluids, was diagnosed with a UTI and placed on Macrobid.  She states that she followed up at her OB office on Monday.  She presents today complaining that she is still unable to keep anything down, including her doxylamine, pyridoxine, Phenergan and Macrobid.          Review of Systems   Constitutional: Negative for chills, diaphoresis and fever.   HENT: Negative for ear pain, sore throat and trouble swallowing.    Eyes: Negative for photophobia and pain.   Respiratory: Negative for shortness of breath, wheezing and stridor.    Cardiovascular: Negative for chest pain and palpitations.   Gastrointestinal: Positive for nausea and vomiting.   Endocrine: Negative for polydipsia and polyphagia.   Genitourinary: Negative for difficulty urinating and flank pain.   Musculoskeletal: Negative for back pain, neck pain and neck stiffness.   Skin: Negative for color change and pallor.   Neurological: Negative for seizures, syncope and speech difficulty.   Psychiatric/Behavioral: Negative for confusion.   All other systems reviewed and are negative.      No past medical history on file.    No Known Allergies    No past surgical history on file.    No family history on file.    Social History     Socioeconomic History   • Marital status:      Spouse name: Not on file   • Number of children: Not on file   • Years of education:  Not on file   • Highest education level: Not on file   Tobacco Use   • Smoking status: Never Smoker           Objective   Physical Exam   Constitutional: She is oriented to person, place, and time. She appears well-developed and well-nourished.  Non-toxic appearance. No distress.   HENT:   Head: Normocephalic and atraumatic.   Eyes: Pupils are equal, round, and reactive to light. EOM are normal. No scleral icterus.   Neck: Normal range of motion. Neck supple. No neck rigidity. No tracheal deviation present.   Cardiovascular: Normal rate, regular rhythm and intact distal pulses.   Pulmonary/Chest: Effort normal and breath sounds normal. No respiratory distress. She exhibits no tenderness.   Abdominal: Soft. Bowel sounds are normal. There is no tenderness. There is no rebound, no guarding and no CVA tenderness.   Musculoskeletal: Normal range of motion. She exhibits no tenderness.   Neurological: She is alert and oriented to person, place, and time. She has normal strength. No sensory deficit. She exhibits normal muscle tone. Coordination normal. GCS eye subscore is 4. GCS verbal subscore is 5. GCS motor subscore is 6.   Skin: Skin is warm and dry. Capillary refill takes less than 2 seconds. No cyanosis. No pallor.   Psychiatric: She has a normal mood and affect. Her behavior is normal.   Nursing note and vitals reviewed.      Procedures  US Ob < 14 Weeks Single or First Gestation   Final Result   Appearance suggestive of single living intrauterine   pregnancy 8 weeks 4 days        This report was finalized on 6/4/2020 12:12 PM by Dr. John Valenzuela MD.            Results for orders placed or performed during the hospital encounter of 06/04/20   Comprehensive Metabolic Panel   Result Value Ref Range    Glucose 98 65 - 99 mg/dL    BUN 10 6 - 20 mg/dL    Creatinine 0.62 0.57 - 1.00 mg/dL    Sodium 137 136 - 145 mmol/L    Potassium 3.6 3.5 - 5.2 mmol/L    Chloride 103 98 - 107 mmol/L    CO2 22.4 22.0 - 29.0 mmol/L     Calcium 9.9 8.6 - 10.5 mg/dL    Total Protein 7.9 6.0 - 8.5 g/dL    Albumin 4.14 3.50 - 5.20 g/dL    ALT (SGPT) 74 (H) 1 - 33 U/L    AST (SGOT) 48 (H) 1 - 32 U/L    Alkaline Phosphatase 93 39 - 117 U/L    Total Bilirubin 0.7 0.2 - 1.2 mg/dL    eGFR Non African Amer 119 >60 mL/min/1.73    Globulin 3.8 gm/dL    A/G Ratio 1.1 g/dL    BUN/Creatinine Ratio 16.1 7.0 - 25.0    Anion Gap 11.6 5.0 - 15.0 mmol/L   Lipase   Result Value Ref Range    Lipase 35 13 - 60 U/L   hCG, Quantitative, Pregnancy   Result Value Ref Range    HCG Quantitative 80,598.00 mIU/mL   Amylase   Result Value Ref Range    Amylase 41 28 - 100 U/L   Urinalysis With Microscopic If Indicated (No Culture) - Urine, Clean Catch   Result Value Ref Range    Color, UA Dark Yellow (A) Yellow, Straw    Appearance, UA Cloudy (A) Clear    pH, UA 6.0 5.0 - 8.0    Specific Gravity, UA >1.030 (H) 1.005 - 1.030    Glucose, UA Negative Negative    Ketones, UA >=160 mg/dL (4+) (A) Negative    Bilirubin, UA Small (1+) (A) Negative    Blood, UA Negative Negative    Protein, UA 30 mg/dL (1+) (A) Negative    Leuk Esterase, UA Large (3+) (A) Negative    Nitrite, UA Positive (A) Negative    Urobilinogen, UA 1.0 E.U./dL 0.2 - 1.0 E.U./dL   CBC Auto Differential   Result Value Ref Range    WBC 13.03 (H) 3.40 - 10.80 10*3/mm3    RBC 5.07 3.77 - 5.28 10*6/mm3    Hemoglobin 14.2 12.0 - 15.9 g/dL    Hematocrit 43.9 34.0 - 46.6 %    MCV 86.6 79.0 - 97.0 fL    MCH 28.0 26.6 - 33.0 pg    MCHC 32.3 31.5 - 35.7 g/dL    RDW 13.4 12.3 - 15.4 %    RDW-SD 41.7 37.0 - 54.0 fl    MPV 9.6 6.0 - 12.0 fL    Platelets 291 140 - 450 10*3/mm3    Neutrophil % 80.1 (H) 42.7 - 76.0 %    Lymphocyte % 13.8 (L) 19.6 - 45.3 %    Monocyte % 5.3 5.0 - 12.0 %    Eosinophil % 0.2 (L) 0.3 - 6.2 %    Basophil % 0.2 0.0 - 1.5 %    Immature Grans % 0.4 0.0 - 0.5 %    Neutrophils, Absolute 10.43 (H) 1.70 - 7.00 10*3/mm3    Lymphocytes, Absolute 1.80 0.70 - 3.10 10*3/mm3    Monocytes, Absolute 0.69 0.10 - 0.90  10*3/mm3    Eosinophils, Absolute 0.03 0.00 - 0.40 10*3/mm3    Basophils, Absolute 0.03 0.00 - 0.20 10*3/mm3    Immature Grans, Absolute 0.05 0.00 - 0.05 10*3/mm3    nRBC 0.0 0.0 - 0.2 /100 WBC   Urinalysis, Microscopic Only - Urine, Clean Catch   Result Value Ref Range    RBC, UA 0-2 None Seen, 0-2 /HPF    WBC, UA Too Numerous to Count (A) None Seen, 0-2 /HPF    Bacteria, UA 2+ (A) None Seen /HPF    Squamous Epithelial Cells, UA 13-20 (A) None Seen, 0-2 /HPF    Hyaline Casts, UA 13-20 None Seen /LPF    Methodology Manual Light Microscopy    Light Blue Top   Result Value Ref Range    Extra Tube hold for add-on                 ED Course  ED Course as of Jun 04 1351   Thu Jun 04, 2020   1338 Patient's emergency department stay has been uncomplicated.  She has shown no signs of distress.  Currently she voices that she is resting comfortably and starting to feel better.  I discussed the case with Dr. Asencio, who is covering for Dr. Liz.  He is admitting patient to the women's health floor under Dr. Liz with him covering.  He advises start IV Rocephin.    [CM]      ED Course User Index  [CM] Po Ptit MD                                           [facility]    Final diagnoses:   UTI (urinary tract infection), bacterial   Dehydration   Intractable vomiting with nausea, unspecified vomiting type   8 weeks gestation of pregnancy             Please note that portions of this note were completed with a voice recognition program.        Po Pitt MD  06/04/20 0577

## 2020-06-05 PROCEDURE — 25010000002 CEFTRIAXONE PER 250 MG: Performed by: OBSTETRICS & GYNECOLOGY

## 2020-06-05 PROCEDURE — 25010000002 PROMETHAZINE PER 50 MG: Performed by: OBSTETRICS & GYNECOLOGY

## 2020-06-05 RX ORDER — PANTOPRAZOLE SODIUM 40 MG/1
40 TABLET, DELAYED RELEASE ORAL
Status: DISCONTINUED | OUTPATIENT
Start: 2020-06-06 | End: 2020-06-06 | Stop reason: HOSPADM

## 2020-06-05 RX ADMIN — SODIUM CHLORIDE 125 ML/HR: 9 INJECTION, SOLUTION INTRAVENOUS at 10:27

## 2020-06-05 RX ADMIN — SODIUM CHLORIDE 125 ML/HR: 9 INJECTION, SOLUTION INTRAVENOUS at 01:51

## 2020-06-05 RX ADMIN — PROMETHAZINE HYDROCHLORIDE 12.5 MG: 25 INJECTION INTRAMUSCULAR; INTRAVENOUS at 05:20

## 2020-06-05 RX ADMIN — CEFTRIAXONE 2 G: 2 INJECTION, POWDER, FOR SOLUTION INTRAMUSCULAR; INTRAVENOUS at 08:13

## 2020-06-05 RX ADMIN — PROMETHAZINE HYDROCHLORIDE 12.5 MG: 25 INJECTION INTRAMUSCULAR; INTRAVENOUS at 15:19

## 2020-06-05 RX ADMIN — SODIUM CHLORIDE 125 ML/HR: 9 INJECTION, SOLUTION INTRAVENOUS at 18:44

## 2020-06-05 NOTE — PAYOR COMM NOTE
"[facility]  NPI:[id number]    Utilization Review  Contact: Ana Maria Bueno RN  Phone: [phone]  Fax:[phone]    INITIATE INPATIENT AUTHORIZATION  NOTICE OF ADMISSION  ICD: E86.0   R11.10   N39.0    Kenia Pollack (25 y.o. Female)     Date of Birth Social Security Number Address Home Phone MRN    1995  [address] [place] [id number] [phone] [id number]    Yarsani Marital Status          Nondenominational        Admission Date Admission Type Admitting Provider Attending Provider Department, Room/Bed    6/4/20 Emergency Heidy Liz DO Ascani, Enrico III, MD [facility] PEDIATRICS, P247/1S    Discharge Date Discharge Disposition Discharge Destination                       Attending Provider:  Hua Asencio III, MD    Allergies:  No Known Allergies    Isolation:  None   Infection:  None   Code Status:  Not on file    Ht:  170.2 cm (67\")   Wt:  103 kg (228 lb)    Admission Cmt:  None   Principal Problem:  None                Active Insurance as of 6/4/2020     Primary Coverage     Payor Plan Insurance Group Employer/Plan Group    [employer] [facility]      Payor Plan Address Payor Plan Phone Number Payor Plan Fax Number Effective Dates    [address] [address]   7/1/2016 - None Entered    [place] [address]       Subscriber Name Subscriber Birth Date Member ID       KENIA POLLACK 1995 [id number]                 Emergency Contacts      (Rel.) Home Phone Work Phone Mobile Phone    Anny Pollack (Mother) [phone] -- --    Everardo Mcdonald (Spouse) [phone] -- --              "

## 2020-06-05 NOTE — PROGRESS NOTES
"Subjective  Still having quite a lot of nausea.  She is throwing up several times through the night as well.  She has a little bit of pain in her right lower quadrant.         Objective     /63   Pulse 78   Temp 97.8 °F (36.6 °C) (Oral)   Resp 18   Ht 170.2 cm (67\")   Wt 103 kg (228 lb)   LMP 2020   SpO2 98%   BMI 35.71 kg/m²   General:  alert, appears stated age and cooperative   Abdomen: soft, bowel sounds active, non-tender, no CVA tenderness   extremities:   Nontender no edema         Assessment   25-year-old  2 para 1 at 9 weeks  Hyperemesis gravidarum-continue IV fluids and anti-medics  She does have a heartburn component so we will start some Protonix.  Urinary tract infection     Plan    Continue IV fluids, antiemetics, antibiotics.  Start Protonix  "

## 2020-06-05 NOTE — PLAN OF CARE
Problem: Patient Care Overview  Goal: Plan of Care Review  Outcome: Ongoing (interventions implemented as appropriate)  Flowsheets (Taken 6/5/2020 8493)  Progress: improving  Plan of Care Reviewed With: patient  Outcome Summary: The patient has complaints of nausea and bouts of emesis. Voiding well. IV infusing. Phenergan given as needed.

## 2020-06-06 VITALS
RESPIRATION RATE: 16 BRPM | WEIGHT: 228 LBS | TEMPERATURE: 98.4 F | DIASTOLIC BLOOD PRESSURE: 72 MMHG | BODY MASS INDEX: 35.79 KG/M2 | OXYGEN SATURATION: 98 % | HEIGHT: 67 IN | HEART RATE: 85 BPM | SYSTOLIC BLOOD PRESSURE: 121 MMHG

## 2020-06-06 PROCEDURE — 25010000002 PROMETHAZINE PER 50 MG: Performed by: OBSTETRICS & GYNECOLOGY

## 2020-06-06 PROCEDURE — 25010000002 CEFTRIAXONE PER 250 MG: Performed by: OBSTETRICS & GYNECOLOGY

## 2020-06-06 RX ORDER — FOLIC ACID 1 MG/1
1 TABLET ORAL DAILY
Qty: 30 TABLET | Refills: 9 | Status: ON HOLD | OUTPATIENT
Start: 2020-06-06 | End: 2020-11-19

## 2020-06-06 RX ORDER — CEPHALEXIN 500 MG/1
500 CAPSULE ORAL 2 TIMES DAILY
Qty: 14 CAPSULE | Refills: 0 | Status: ON HOLD | OUTPATIENT
Start: 2020-06-06 | End: 2020-11-19

## 2020-06-06 RX ORDER — PROMETHAZINE HYDROCHLORIDE 12.5 MG/1
12.5 TABLET ORAL EVERY 6 HOURS PRN
Qty: 30 TABLET | Refills: 1 | Status: ON HOLD | OUTPATIENT
Start: 2020-06-06 | End: 2020-11-19

## 2020-06-06 RX ORDER — PANTOPRAZOLE SODIUM 40 MG/1
40 TABLET, DELAYED RELEASE ORAL DAILY
Qty: 30 TABLET | Refills: 1 | Status: ON HOLD | OUTPATIENT
Start: 2020-06-06 | End: 2020-12-09

## 2020-06-06 RX ORDER — METOCLOPRAMIDE 10 MG/1
10 TABLET ORAL EVERY 6 HOURS PRN
Qty: 30 TABLET | Refills: 1 | Status: ON HOLD | OUTPATIENT
Start: 2020-06-06 | End: 2020-11-28

## 2020-06-06 RX ADMIN — PANTOPRAZOLE SODIUM 40 MG: 40 TABLET, DELAYED RELEASE ORAL at 06:49

## 2020-06-06 RX ADMIN — CEFTRIAXONE 2 G: 2 INJECTION, POWDER, FOR SOLUTION INTRAMUSCULAR; INTRAVENOUS at 08:24

## 2020-06-06 RX ADMIN — SODIUM CHLORIDE 125 ML/HR: 9 INJECTION, SOLUTION INTRAVENOUS at 02:44

## 2020-06-06 RX ADMIN — PROMETHAZINE HYDROCHLORIDE 12.5 MG: 25 INJECTION INTRAMUSCULAR; INTRAVENOUS at 06:47

## 2020-06-06 NOTE — PLAN OF CARE
Vital signs stable, no signs of distress noted. Currently during this shift no nausea or vomiting noted.

## 2020-06-06 NOTE — PLAN OF CARE
Problem: Patient Care Overview  Goal: Plan of Care Review  Outcome: Outcome(s) achieved  Flowsheets (Taken 6/6/2020 1111)  Progress: improving  Plan of Care Reviewed With: patient  Outcome Summary: Pt is being discharged home in stable condition.

## 2020-06-06 NOTE — PROGRESS NOTES
"Subjective    The patient has no complaints.  Tolerating oral intake.  Having a small amount of nausea but still tolerating oral intake.  No further heartburn and her abdominal pain is resolved.           Objective     /72 (BP Location: Left arm, Patient Position: Lying)   Pulse 85   Temp 98.4 °F (36.9 °C) (Oral)   Resp 16   Ht 170.2 cm (67\")   Wt 103 kg (228 lb)   LMP 2020   SpO2 98%   BMI 35.71 kg/m²   General:  alert, appears stated age and cooperative   Abdomen: soft, bowel sounds active, non-tender             Assessment   25-year-old  2 para 1 at 9 weeks  Hyperemesis gravidarum-she is now tolerating oral intake and appears to be hydrated we will go ahead and discharge her home.  I will give her a prescription for Phenergan and Reglan.  As well as a Protonix.  Urinary tract infection-she has been treated with ceftriaxone here.  We will continue Keflex to go home with.     Plan     1. Continue any current medications.    "

## 2020-06-06 NOTE — DISCHARGE SUMMARY
Date of Discharge:      Discharge Diagnosis:   Dehydration  Hyperemesis gravidarum  Urinary tract infection    Problem List:    Dehydration    Mild hyperemesis gravidarum    Urinary tract infection in mother during first trimester of pregnancy      Presenting Problem/History of Present Illness  Dehydration [E86.0]      Hospital Course  Patient is a 25 y.o. female presented with dehydration secondary to hyperemesis gravidarum.  She was also found to have a urinary tract infection.  She was given anti-fluids anti-medics and over 2/9 she improved.  She was also given Rocephin for the urinary tract infection.  She was continued on a 7-day course of Keflex.  Procedures Performed         Consults:   Consults     Date and Time Order Name Status Description    6/4/2020 1341 IP General Consult (Use specialty-specific consult if known)            Pertinent Test Results:    Condition on Discharge: Stable  Vital Signs  Temp:  [98.4 °F (36.9 °C)] 98.4 °F (36.9 °C)  Heart Rate:  [75-85] 85  Resp:  [16-18] 16  BP: (103-121)/(55-72) 121/72    Discharge Disposition  Home or Self Care    Discharge Medications     Discharge Medications      New Medications      Instructions Start Date   cephalexin 500 MG capsule  Commonly known as:  Keflex   500 mg, Oral, 2 Times Daily      folic acid 1 MG tablet  Commonly known as:  FOLVITE   1 mg, Oral, Daily      metoclopramide 10 MG tablet  Commonly known as:  Reglan   10 mg, Oral, Every 6 Hours PRN      pantoprazole 40 MG EC tablet  Commonly known as:  PROTONIX   40 mg, Oral, Daily         Changes to Medications      Instructions Start Date   promethazine 25 MG tablet  Commonly known as:  PHENERGAN  What changed:  Another medication with the same name was added. Make sure you understand how and when to take each.   25 mg, Oral, Every 4 Hours PRN      promethazine 12.5 MG tablet  Commonly known as:  PHENERGAN  What changed:  You were already taking a medication with the same name, and this  prescription was added. Make sure you understand how and when to take each.   12.5 mg, Oral, Every 6 Hours PRN         Continue These Medications      Instructions Start Date   pyridoxine 25 MG tablet  Commonly known as:  VITAMIN B-6   25 mg, Oral, 3 Times Daily PRN      [employer] 25 MG tablet  Generic drug:  doxylamine   25 mg, Oral, Nightly PRN         Stop These Medications    nitrofurantoin (macrocrystal-monohydrate) 100 MG capsule  Commonly known as:  MACROBID            Discharge Diet       Activity at Discharge      Follow-up Appointments  No future appointments.        Time: Discharge 15 min

## 2020-06-09 LAB
BACTERIA SPEC AEROBE CULT: NORMAL
BACTERIA SPEC AEROBE CULT: NORMAL

## 2020-06-16 LAB
EXTERNAL ABO GROUPING: NORMAL
EXTERNAL ANTIBODY SCREEN: NEGATIVE
EXTERNAL CHLAMYDIA SCREEN: NEGATIVE
EXTERNAL GONORRHEA SCREEN: NEGATIVE
EXTERNAL HEPATITIS B SURFACE ANTIGEN: NEGATIVE
EXTERNAL RH FACTOR: POSITIVE
EXTERNAL RUBELLA QUALITATIVE: NORMAL
HIV1 P24 AG SERPL QL IA: NORMAL

## 2020-10-07 LAB
EXTERNAL GLUCOSE TOLERANCE TEST FASTING: 78 MG/DL
EXTERNAL GTT 1 HOUR: 153

## 2020-10-16 LAB — EXTERNAL GTT 3 HOURS: 148

## 2020-11-19 ENCOUNTER — HOSPITAL ENCOUNTER (OUTPATIENT)
Facility: HOSPITAL | Age: 25
Setting detail: OBSERVATION
Discharge: HOME | End: 2020-11-20
Attending: OBSTETRICS & GYNECOLOGY | Admitting: OBSTETRICS & GYNECOLOGY

## 2020-11-19 ENCOUNTER — APPOINTMENT (OUTPATIENT)
Dept: ULTRASOUND IMAGING | Facility: HOSPITAL | Age: 25
End: 2020-11-19

## 2020-11-19 PROBLEM — N20.0 KIDNEY STONE COMPLICATING PREGNANCY, THIRD TRIMESTER: Status: ACTIVE | Noted: 2020-11-19

## 2020-11-19 PROBLEM — Z36.89 NON-STRESS TEST REACTIVE: Status: ACTIVE | Noted: 2020-11-19

## 2020-11-19 PROBLEM — O26.833 KIDNEY STONE COMPLICATING PREGNANCY, THIRD TRIMESTER: Status: ACTIVE | Noted: 2020-11-19

## 2020-11-19 PROBLEM — O26.899 ABDOMINAL PAIN AFFECTING PREGNANCY: Status: ACTIVE | Noted: 2020-11-19

## 2020-11-19 PROBLEM — R10.9 ABDOMINAL PAIN AFFECTING PREGNANCY: Status: ACTIVE | Noted: 2020-11-19

## 2020-11-19 PROBLEM — O23.40 UTI (URINARY TRACT INFECTION) DURING PREGNANCY: Status: ACTIVE | Noted: 2020-11-19

## 2020-11-19 LAB
ALBUMIN SERPL-MCNC: 4.1 G/DL (ref 3.5–5.2)
ALBUMIN/GLOB SERPL: 1.2 G/DL
ALP SERPL-CCNC: 127 U/L (ref 39–117)
ALT SERPL W P-5'-P-CCNC: 14 U/L (ref 1–33)
ANION GAP SERPL CALCULATED.3IONS-SCNC: 12.4 MMOL/L (ref 5–15)
AST SERPL-CCNC: 19 U/L (ref 1–32)
BACTERIA UR QL AUTO: ABNORMAL /HPF
BASOPHILS # BLD AUTO: 0.02 10*3/MM3 (ref 0–0.2)
BASOPHILS NFR BLD AUTO: 0.2 % (ref 0–1.5)
BILIRUB SERPL-MCNC: 0.4 MG/DL (ref 0–1.2)
BILIRUB UR QL STRIP: ABNORMAL
BUN SERPL-MCNC: 8 MG/DL (ref 6–20)
BUN/CREAT SERPL: 12.9 (ref 7–25)
CALCIUM SPEC-SCNC: 9.8 MG/DL (ref 8.6–10.5)
CHLORIDE SERPL-SCNC: 103 MMOL/L (ref 98–107)
CLARITY UR: ABNORMAL
CO2 SERPL-SCNC: 22.6 MMOL/L (ref 22–29)
COLOR UR: ABNORMAL
CREAT SERPL-MCNC: 0.62 MG/DL (ref 0.57–1)
DEPRECATED RDW RBC AUTO: 40.1 FL (ref 37–54)
EOSINOPHIL # BLD AUTO: 0 10*3/MM3 (ref 0–0.4)
EOSINOPHIL NFR BLD AUTO: 0 % (ref 0.3–6.2)
ERYTHROCYTE [DISTWIDTH] IN BLOOD BY AUTOMATED COUNT: 13.3 % (ref 12.3–15.4)
GFR SERPL CREATININE-BSD FRML MDRD: 119 ML/MIN/1.73
GLOBULIN UR ELPH-MCNC: 3.3 GM/DL
GLUCOSE SERPL-MCNC: 139 MG/DL (ref 65–99)
GLUCOSE UR STRIP-MCNC: NEGATIVE MG/DL
HCT VFR BLD AUTO: 37.1 % (ref 34–46.6)
HGB BLD-MCNC: 11.3 G/DL (ref 12–15.9)
HGB UR QL STRIP.AUTO: ABNORMAL
HYALINE CASTS UR QL AUTO: ABNORMAL /LPF
IMM GRANULOCYTES # BLD AUTO: 0.12 10*3/MM3 (ref 0–0.05)
IMM GRANULOCYTES NFR BLD AUTO: 0.9 % (ref 0–0.5)
KETONES UR QL STRIP: ABNORMAL
LEUKOCYTE ESTERASE UR QL STRIP.AUTO: ABNORMAL
LYMPHOCYTES # BLD AUTO: 0.95 10*3/MM3 (ref 0.7–3.1)
LYMPHOCYTES NFR BLD AUTO: 7.4 % (ref 19.6–45.3)
MCH RBC QN AUTO: 25.3 PG (ref 26.6–33)
MCHC RBC AUTO-ENTMCNC: 30.5 G/DL (ref 31.5–35.7)
MCV RBC AUTO: 83.2 FL (ref 79–97)
MONOCYTES # BLD AUTO: 0.31 10*3/MM3 (ref 0.1–0.9)
MONOCYTES NFR BLD AUTO: 2.4 % (ref 5–12)
NEUTROPHILS NFR BLD AUTO: 11.46 10*3/MM3 (ref 1.7–7)
NEUTROPHILS NFR BLD AUTO: 89.1 % (ref 42.7–76)
NITRITE UR QL STRIP: NEGATIVE
NRBC BLD AUTO-RTO: 0 /100 WBC (ref 0–0.2)
PH UR STRIP.AUTO: 5.5 [PH] (ref 5–8)
PLATELET # BLD AUTO: 238 10*3/MM3 (ref 140–450)
PMV BLD AUTO: 11 FL (ref 6–12)
POTASSIUM SERPL-SCNC: 3.7 MMOL/L (ref 3.5–5.2)
PROT SERPL-MCNC: 7.4 G/DL (ref 6–8.5)
PROT UR QL STRIP: ABNORMAL
RBC # BLD AUTO: 4.46 10*6/MM3 (ref 3.77–5.28)
RBC # UR: ABNORMAL /HPF
REF LAB TEST METHOD: ABNORMAL
SARS-COV-2 RNA RESP QL NAA+PROBE: NOT DETECTED
SODIUM SERPL-SCNC: 138 MMOL/L (ref 136–145)
SP GR UR STRIP: 1.03 (ref 1–1.03)
SQUAMOUS #/AREA URNS HPF: ABNORMAL /HPF
UROBILINOGEN UR QL STRIP: ABNORMAL
WBC # BLD AUTO: 12.86 10*3/MM3 (ref 3.4–10.8)
WBC UR QL AUTO: ABNORMAL /HPF

## 2020-11-19 PROCEDURE — 76775 US EXAM ABDO BACK WALL LIM: CPT

## 2020-11-19 PROCEDURE — 81001 URINALYSIS AUTO W/SCOPE: CPT | Performed by: OBSTETRICS & GYNECOLOGY

## 2020-11-19 PROCEDURE — 85025 COMPLETE CBC W/AUTO DIFF WBC: CPT | Performed by: OBSTETRICS & GYNECOLOGY

## 2020-11-19 PROCEDURE — G0378 HOSPITAL OBSERVATION PER HR: HCPCS

## 2020-11-19 PROCEDURE — 96361 HYDRATE IV INFUSION ADD-ON: CPT

## 2020-11-19 PROCEDURE — 59025 FETAL NON-STRESS TEST: CPT

## 2020-11-19 PROCEDURE — 96372 THER/PROPH/DIAG INJ SC/IM: CPT

## 2020-11-19 PROCEDURE — C9803 HOPD COVID-19 SPEC COLLECT: HCPCS

## 2020-11-19 PROCEDURE — 76819 FETAL BIOPHYS PROFIL W/O NST: CPT

## 2020-11-19 PROCEDURE — 25010000002 METOCLOPRAMIDE PER 10 MG: Performed by: OBSTETRICS & GYNECOLOGY

## 2020-11-19 PROCEDURE — 36415 COLL VENOUS BLD VENIPUNCTURE: CPT | Performed by: OBSTETRICS & GYNECOLOGY

## 2020-11-19 PROCEDURE — 80053 COMPREHEN METABOLIC PANEL: CPT | Performed by: OBSTETRICS & GYNECOLOGY

## 2020-11-19 PROCEDURE — 96375 TX/PRO/DX INJ NEW DRUG ADDON: CPT

## 2020-11-19 PROCEDURE — 87086 URINE CULTURE/COLONY COUNT: CPT | Performed by: OBSTETRICS & GYNECOLOGY

## 2020-11-19 PROCEDURE — G0463 HOSPITAL OUTPT CLINIC VISIT: HCPCS

## 2020-11-19 PROCEDURE — 25010000003 MEPERIDINE PER 100 MG: Performed by: OBSTETRICS & GYNECOLOGY

## 2020-11-19 PROCEDURE — 76819 FETAL BIOPHYS PROFIL W/O NST: CPT | Performed by: RADIOLOGY

## 2020-11-19 PROCEDURE — 87635 SARS-COV-2 COVID-19 AMP PRB: CPT | Performed by: OBSTETRICS & GYNECOLOGY

## 2020-11-19 PROCEDURE — 76775 US EXAM ABDO BACK WALL LIM: CPT | Performed by: RADIOLOGY

## 2020-11-19 PROCEDURE — 25010000002 ONDANSETRON PER 1 MG: Performed by: OBSTETRICS & GYNECOLOGY

## 2020-11-19 PROCEDURE — 25010000002 CEFTRIAXONE: Performed by: OBSTETRICS & GYNECOLOGY

## 2020-11-19 PROCEDURE — 25010000002 TERBUTALINE PER 1 MG: Performed by: OBSTETRICS & GYNECOLOGY

## 2020-11-19 PROCEDURE — P9612 CATHETERIZE FOR URINE SPEC: HCPCS

## 2020-11-19 RX ORDER — OXYCODONE HYDROCHLORIDE AND ACETAMINOPHEN 5; 325 MG/1; MG/1
1 TABLET ORAL EVERY 4 HOURS PRN
Status: DISCONTINUED | OUTPATIENT
Start: 2020-11-19 | End: 2020-11-20 | Stop reason: HOSPADM

## 2020-11-19 RX ORDER — PANTOPRAZOLE SODIUM 40 MG/1
40 TABLET, DELAYED RELEASE ORAL DAILY
Status: DISCONTINUED | OUTPATIENT
Start: 2020-11-20 | End: 2020-11-20 | Stop reason: HOSPADM

## 2020-11-19 RX ORDER — ONDANSETRON 4 MG/1
4 TABLET, FILM COATED ORAL EVERY 6 HOURS PRN
Status: DISCONTINUED | OUTPATIENT
Start: 2020-11-19 | End: 2020-11-20 | Stop reason: HOSPADM

## 2020-11-19 RX ORDER — METOCLOPRAMIDE 10 MG/1
10 TABLET ORAL EVERY 6 HOURS PRN
Status: DISCONTINUED | OUTPATIENT
Start: 2020-11-19 | End: 2020-11-20 | Stop reason: HOSPADM

## 2020-11-19 RX ORDER — METOCLOPRAMIDE HYDROCHLORIDE 5 MG/ML
10 INJECTION INTRAMUSCULAR; INTRAVENOUS ONCE
Status: COMPLETED | OUTPATIENT
Start: 2020-11-19 | End: 2020-11-19

## 2020-11-19 RX ORDER — MEPERIDINE HYDROCHLORIDE 25 MG/ML
25 INJECTION INTRAMUSCULAR; INTRAVENOUS; SUBCUTANEOUS ONCE
Status: COMPLETED | OUTPATIENT
Start: 2020-11-19 | End: 2020-11-19

## 2020-11-19 RX ORDER — ONDANSETRON 2 MG/ML
4 INJECTION INTRAMUSCULAR; INTRAVENOUS EVERY 6 HOURS PRN
Status: DISCONTINUED | OUTPATIENT
Start: 2020-11-19 | End: 2020-11-20 | Stop reason: HOSPADM

## 2020-11-19 RX ORDER — TERBUTALINE SULFATE 1 MG/ML
0.25 INJECTION, SOLUTION SUBCUTANEOUS ONCE
Status: COMPLETED | OUTPATIENT
Start: 2020-11-19 | End: 2020-11-19

## 2020-11-19 RX ORDER — SODIUM CHLORIDE, SODIUM LACTATE, POTASSIUM CHLORIDE, CALCIUM CHLORIDE 600; 310; 30; 20 MG/100ML; MG/100ML; MG/100ML; MG/100ML
125 INJECTION, SOLUTION INTRAVENOUS CONTINUOUS
Status: DISCONTINUED | OUTPATIENT
Start: 2020-11-19 | End: 2020-11-20 | Stop reason: HOSPADM

## 2020-11-19 RX ADMIN — METOCLOPRAMIDE 10 MG: 5 INJECTION, SOLUTION INTRAMUSCULAR; INTRAVENOUS at 11:17

## 2020-11-19 RX ADMIN — TERBUTALINE SULFATE 0.25 MG: 1 INJECTION SUBCUTANEOUS at 11:17

## 2020-11-19 RX ADMIN — SODIUM CHLORIDE, POTASSIUM CHLORIDE, SODIUM LACTATE AND CALCIUM CHLORIDE 1000 ML: 600; 310; 30; 20 INJECTION, SOLUTION INTRAVENOUS at 09:00

## 2020-11-19 RX ADMIN — MEPERIDINE HYDROCHLORIDE 25 MG: 25 INJECTION INTRAMUSCULAR; INTRAVENOUS; SUBCUTANEOUS at 11:17

## 2020-11-19 RX ADMIN — OXYCODONE HYDROCHLORIDE AND ACETAMINOPHEN 1 TABLET: 5; 325 TABLET ORAL at 22:15

## 2020-11-19 RX ADMIN — CEFTRIAXONE 1 G: 1 INJECTION, POWDER, FOR SOLUTION INTRAMUSCULAR; INTRAVENOUS at 12:10

## 2020-11-19 RX ADMIN — SODIUM CHLORIDE, POTASSIUM CHLORIDE, SODIUM LACTATE AND CALCIUM CHLORIDE 125 ML/HR: 600; 310; 30; 20 INJECTION, SOLUTION INTRAVENOUS at 09:52

## 2020-11-19 RX ADMIN — ONDANSETRON 4 MG: 2 INJECTION INTRAMUSCULAR; INTRAVENOUS at 09:51

## 2020-11-19 NOTE — H&P
Chief complaint   Chief Complaint   Patient presents with   • Abdominal Pain     C/O ABD AND BACK PAIN       History of Present Illness: Patient is a 25 y.o. female who presents with IUP at 33w1d weeks gestation. G 2, P 1. Kidney Stones       Past Medical History:   Diagnosis Date   • Hypertension     At age 18   • Kidney stone    • Urinary tract infection    +UDS    Blood Type: A   Fetal Gender: Male    Past Surgical History:   Procedure Laterality Date   • KIDNEY STONE SURGERY  2014    Kidney stone removal   • LAPAROSCOPIC CHOLECYSTECTOMY     • TONSILLECTOMY       Family History   Problem Relation Age of Onset   • Ovarian cancer Paternal Grandmother    • Hypertension Maternal Grandmother    • Breast cancer Paternal Aunt      Social History     Tobacco Use   • Smoking status: Never Smoker   • Smokeless tobacco: Never Used   Substance Use Topics   • Alcohol use: Never     Frequency: Never   • Drug use: Never     Medications Prior to Admission   Medication Sig Dispense Refill Last Dose   • metoclopramide (Reglan) 10 MG tablet Take 1 tablet by mouth Every 6 (Six) Hours As Needed (nausea). 30 tablet 1 11/18/2020 at 0800   • pantoprazole (PROTONIX) 40 MG EC tablet Take 1 tablet by mouth Daily. 30 tablet 1 11/18/2020 at 0900     Allergies:  Patient has no known allergies.      Vital Signs  Temp:  [97.5 °F (36.4 °C)] 97.5 °F (36.4 °C)  Heart Rate:  [67] 67  Resp:  [18] 18  BP: (125)/(67) 125/67    Radiology  Imaging Results (Last 24 Hours)     Procedure Component Value Units Date/Time    US Renal Bilateral [[id number]] Collected: 11/19/20 1125     Updated: 11/19/20 1134    Narrative:      EXAMINATION: US RENAL BILATERAL-      CLINICAL INDICATION: ABD PAIN H/O STONES        COMPARISON: None immediately available.     PROCEDURE: Sonographic imaging of the kidneys     FINDINGS:  Imaging of the right kidney demonstrates the right kidney measuring  11.88 x 3.34 x 6.44 cm.. No solid mass or hydronephrosis.     Left kidney  measures 11.53 x 4.55 x 4.98 cm. Very mild left-sided  pelvocaliectasis.       Impression:      1. Mild left-sided pelvocaliectasis.     This report was finalized on 11/19/2020 11:32 AM by Dr. John Valenzuela MD.       US Fetal Biophysical Profile;Without Non-Stress Testing [[id number]] Collected: 11/19/20 1126     Updated: 11/19/20 1134    Narrative:      US FETAL BIOPHYSICAL PROFILE;WITHOUT NON-STRESS TESTING-      CLINICAL INDICATION: PTL     COMPARISON: 06/04/2020     FINDINGS: Transabdominal sonographic imaging of the pregnant pelvis  shows a single living intrauterine pregnancy.     Fetal lie is cephalic.     Placenta is fundal.     Biophysical profile score was 8 out of a possible 8.     Amniotic fluid index 9.20 cm.     Estimated weight 4 lbs. 5 oz.     Estimated age 32 weeks 6 days.       Impression:      Single living intrauterine 32 weeks 6 day pregnancy with  biophysical profile score 8 out of 8.      This report was finalized on 11/19/2020 11:32 AM by Dr. John Valenzuela MD.             Labs  Lab Results (last 24 hours)     Procedure Component Value Units Date/Time    COVID PRE-OP / PRE-PROCEDURE SCREENING ORDER (NO ISOLATION) - Swab, Nasopharynx [[id number]]  (Normal) Collected: 11/19/20 1224    Specimen: Swab from Nasopharynx Updated: 11/19/20 1334    Narrative:      The following orders were created for panel order COVID PRE-OP / PRE-PROCEDURE SCREENING ORDER (NO ISOLATION) - Swab, Nasopharynx.  Procedure                               Abnormality         Status                     ---------                               -----------         ------                     COVID-19,CEPHEID,COR/JUANA...[[id number]]  Normal              Final result                 Please view results for these tests on the individual orders.    COVID-19,CEPHEID,COR/JUANA/PAD IN-HOUSE(OR EMERGENT/ADD-ON),NP SWAB IN TRANSPORT MEDIA 3-4 HR TAT - Swab, Nasopharynx [[id number]]  (Normal) Collected: 11/19/20 1224    Specimen: Swab from  Nasopharynx Updated: 11/19/20 1338     COVID19 Not Detected    Narrative:      Fact sheet for providers: https://www.fda.gov/media/707719/download     Fact sheet for patients: https://www.fda.gov/media/644255/download    Comprehensive Metabolic Panel [[id number]]  (Abnormal) Collected: 11/19/20 0912    Specimen: Blood Updated: 11/19/20 1002     Glucose 139 mg/dL      BUN 8 mg/dL      Creatinine 0.62 mg/dL      Sodium 138 mmol/L      Potassium 3.7 mmol/L      Chloride 103 mmol/L      CO2 22.6 mmol/L      Calcium 9.8 mg/dL      Total Protein 7.4 g/dL      Albumin 4.10 g/dL      ALT (SGPT) 14 U/L      AST (SGOT) 19 U/L      Alkaline Phosphatase 127 U/L      Total Bilirubin 0.4 mg/dL      eGFR Non African Amer 119 mL/min/1.73      Globulin 3.3 gm/dL      A/G Ratio 1.2 g/dL      BUN/Creatinine Ratio 12.9     Anion Gap 12.4 mmol/L     Narrative:      GFR Normal >60  Chronic Kidney Disease <60  Kidney Failure <15      Urinalysis, Microscopic Only - Urine, Catheter In/Out [[id number]]  (Abnormal) Collected: 11/19/20 0857    Specimen: Urine, Catheter In/Out Updated: 11/19/20 0955     RBC, UA 13-20 /HPF      WBC, UA 6-12 /HPF      Bacteria, UA None Seen /HPF      Squamous Epithelial Cells, UA 0-2 /HPF      Hyaline Casts, UA 13-20 /LPF      Methodology Manual Light Microscopy    Urine Culture - Urine, Urine, Catheter In/Out [[id number]] Collected: 11/19/20 0857    Specimen: Urine, Catheter In/Out Updated: 11/19/20 0955    Urinalysis With Culture If Indicated - Urine, Catheter In/Out [[id number]]  (Abnormal) Collected: 11/19/20 0857    Specimen: Urine, Catheter In/Out Updated: 11/19/20 0945     Color, UA Dark Yellow     Appearance, UA Cloudy     pH, UA 5.5     Specific Gravity, UA 1.026     Glucose, UA Negative     Ketones, UA 80 mg/dL (3+)     Bilirubin, UA Small (1+)     Blood, UA Large (3+)     Protein, UA 30 mg/dL (1+)     Leuk Esterase, UA Small (1+)     Nitrite, UA Negative     Urobilinogen, UA 1.0 E.U./dL    CBC &  Differential [[id number]]  (Abnormal) Collected: 11/19/20 0912    Specimen: Blood Updated: 11/19/20 0940    Narrative:      The following orders were created for panel order CBC & Differential.  Procedure                               Abnormality         Status                     ---------                               -----------         ------                     CBC Auto Differential[[id number]]        Abnormal            Final result                 Please view results for these tests on the individual orders.    CBC Auto Differential [[id number]]  (Abnormal) Collected: 11/19/20 0912    Specimen: Blood Updated: 11/19/20 0940     WBC 12.86 10*3/mm3      RBC 4.46 10*6/mm3      Hemoglobin 11.3 g/dL      Hematocrit 37.1 %      MCV 83.2 fL      MCH 25.3 pg      MCHC 30.5 g/dL      RDW 13.3 %      RDW-SD 40.1 fl      MPV 11.0 fL      Platelets 238 10*3/mm3      Neutrophil % 89.1 %      Lymphocyte % 7.4 %      Monocyte % 2.4 %      Eosinophil % 0.0 %      Basophil % 0.2 %      Immature Grans % 0.9 %      Neutrophils, Absolute 11.46 10*3/mm3      Lymphocytes, Absolute 0.95 10*3/mm3      Monocytes, Absolute 0.31 10*3/mm3      Eosinophils, Absolute 0.00 10*3/mm3      Basophils, Absolute 0.02 10*3/mm3      Immature Grans, Absolute 0.12 10*3/mm3      nRBC 0.0 /100 WBC     GTT Fasting [[id number]] Resulted: 10/07/20     Specimen: Blood Updated: 11/19/20 0916     External Glucose, GTT - Fasting 78 mg/dL     GTT 1 Hour [[id number]] Resulted: 10/07/20     Specimen: Blood Updated: 11/19/20 0916     External GTT 1 Hour 153    Hepatitis B Surface Antigen [[id number]] Resulted: 06/16/20     Specimen: Blood Updated: 11/19/20 0916     External Hepatitis B Surface Ag Negative    Rubella Antibody, IgG [[id number]] Resulted: 06/16/20     Specimen: Blood Updated: 11/19/20 0916     External Rubella Qual Non-Immune    HIV-1 Antibody, EIA [[id number]] Resulted: 06/16/20     Specimen: Blood Updated: 11/19/20 0916     External HIV Antibody  Non-Reactive    Chlamydia trachomatis, Neisseria gonorrhoeae, PCR w/ confirmation - Swab, Vagina [[id number]] Resulted: 06/16/20     Specimen: Swab from Vagina Updated: 11/19/20 0916     External Chlamydia Screen Negative    Gonorrhea Screen - Swab, [[id number]] Resulted: 06/16/20     Specimen: Swab Updated: 11/19/20 0916     External Gonorrhea Screen Negative    GTT 3 Hour [[id number]] Resulted: 10/16/20     Specimen: Blood Updated: 11/19/20 0916     EXTGTT3 148            Review of Systems    The following systems were reviewed and negative;  ENT, respiratory, cardiovascular, gastrointestinal, genitourinary, breast, endocrine and allergies / immunologic.      Physical Exam:      General Appearance:    Alert, cooperative, in no acute distress   Head:    Normocephalic, without obvious abnormality, atraumatic   Eyes:            Lids and lashes normal, conjunctivae and sclerae normal, no   icterus, no pallor, corneas clear, PERRLA   Ears:    Ears appear intact with no abnormalities noted   Throat:   No oral lesions, no thrush, oral mucosa moist   Neck:   No adenopathy, supple, trachea midline, no thyromegaly, no     carotid bruit, no JVD   Back:     No kyphosis present, no scoliosis present, no skin lesions,       erythema or scars, no tenderness to percussion or                   palpation,   range of motion normal   Lungs:     Clear to auscultation,respirations regular, even and                   unlabored    Heart:    Regular rhythm and normal rate, normal S1 and S2, no            murmur, no gallop, no rub, no click   Breast Exam:    Deferred   Abdomen:     Normal bowel sounds, no masses, no organomegaly, soft        non-tender, non-distended, no guarding, no rebound                 tenderness   Genitalia:    Deferred   Extremities:   Moves all extremities well, no edema, no cyanosis, no              redness   Pulses:   Pulses palpable and equal bilaterally   Skin:   No bleeding, bruising or rash   Lymph nodes:   No  palpable adenopathy   Neurologic:   Cranial nerves 2 - 12 grossly intact, sensation intact, DTR        present and equal bilaterally         Assessment: Patient is a 25 y.o. female who presents with IUP at 33w1d weeks gestation. G 2, P 1. Kidney Stones  Chief Complaint   Patient presents with   • Abdominal Pain     C/O ABD AND BACK PAIN       Plan of Care: Admit. Proceed with IV fluids, antibiotics, BPP, renal US in       Hua Asencio III, MD  11/19/20  15:03 EST

## 2020-11-19 NOTE — NON STRESS TEST
Kenia Pollack, a  at 33w1d with an MANJINDER of 2021, by Ultrasound, was seen at [facility] LABOR DELIVERY for a nonstress test.    Chief Complaint   Patient presents with   • Abdominal Pain     C/O ABD AND BACK PAIN       Patient Active Problem List   Diagnosis   • Dehydration   • Mild hyperemesis gravidarum   • Urinary tract infection in mother during first trimester of pregnancy   • Abdominal pain affecting pregnancy       Start Time: 0845  Stop Time: 0910    Interpretation A  Nonstress Test Interpretation A: Reactive (20 0900 : Gladis Gunn, BERNADINE)

## 2020-11-20 VITALS
DIASTOLIC BLOOD PRESSURE: 56 MMHG | HEIGHT: 67 IN | SYSTOLIC BLOOD PRESSURE: 110 MMHG | WEIGHT: 220 LBS | HEART RATE: 76 BPM | RESPIRATION RATE: 20 BRPM | TEMPERATURE: 98 F | BODY MASS INDEX: 34.53 KG/M2 | OXYGEN SATURATION: 98 %

## 2020-11-20 LAB — BACTERIA SPEC AEROBE CULT: NO GROWTH

## 2020-11-20 PROCEDURE — 96365 THER/PROPH/DIAG IV INF INIT: CPT

## 2020-11-20 PROCEDURE — 25010000002 CEFTRIAXONE PER 250 MG: Performed by: OBSTETRICS & GYNECOLOGY

## 2020-11-20 PROCEDURE — G0378 HOSPITAL OBSERVATION PER HR: HCPCS

## 2020-11-20 PROCEDURE — 59025 FETAL NON-STRESS TEST: CPT

## 2020-11-20 RX ORDER — NITROFURANTOIN MACROCRYSTALS 100 MG/1
100 CAPSULE ORAL 4 TIMES DAILY
Qty: 30 CAPSULE | Refills: 1 | Status: SHIPPED | OUTPATIENT
Start: 2020-11-20 | End: 2020-11-20 | Stop reason: SDUPTHER

## 2020-11-20 RX ORDER — NITROFURANTOIN MACROCRYSTALS 100 MG/1
100 CAPSULE ORAL NIGHTLY
Qty: 30 CAPSULE | Refills: 1 | Status: SHIPPED | OUTPATIENT
Start: 2020-11-27 | End: 2020-11-30 | Stop reason: HOSPADM

## 2020-11-20 RX ORDER — CEFDINIR 300 MG/1
300 CAPSULE ORAL 2 TIMES DAILY
Qty: 14 CAPSULE | Refills: 0 | Status: ON HOLD | OUTPATIENT
Start: 2020-11-20 | End: 2020-11-28

## 2020-11-20 RX ADMIN — CEFTRIAXONE 1 G: 1 INJECTION, POWDER, FOR SOLUTION INTRAMUSCULAR; INTRAVENOUS at 10:20

## 2020-11-20 RX ADMIN — SODIUM CHLORIDE, POTASSIUM CHLORIDE, SODIUM LACTATE AND CALCIUM CHLORIDE 125 ML/HR: 600; 310; 30; 20 INJECTION, SOLUTION INTRAVENOUS at 07:44

## 2020-11-20 RX ADMIN — PANTOPRAZOLE SODIUM 40 MG: 40 TABLET, DELAYED RELEASE ORAL at 07:42

## 2020-11-20 NOTE — PLAN OF CARE
Goal Outcome Evaluation:  Plan of Care Reviewed With: patient      PT DENIES PAIN AND FEELING MUCH BETTER. DISCHARGED HOME ON ANTIBIOTICS. HAS RETURN APPOINTMENT NEXT WEEK.

## 2020-11-20 NOTE — NON STRESS TEST
Kenia Pollack, a  at 33w2d with an MANJINDER of 2021, by Ultrasound, was seen at [facility] LABOR DELIVERY for a nonstress test.    Chief Complaint   Patient presents with   • Abdominal Pain     C/O ABD AND BACK PAIN       Patient Active Problem List   Diagnosis   • Dehydration   • Mild hyperemesis gravidarum   • Urinary tract infection in mother during first trimester of pregnancy   • Abdominal pain affecting pregnancy   • UTI (urinary tract infection) during pregnancy   • Non-stress test reactive   • Kidney stone complicating pregnancy, third trimester       Start Time: 908  Stop Time: 928    Interpretation A  Nonstress Test Interpretation A: Reactive (20 : Shruti Hoffman, RN)  Comments A: VERIFIED BY WESTON MAGUIRE RN (20 : Shruti Hoffman, RN)

## 2020-11-20 NOTE — NON STRESS TEST
Kenia Pollack, a  at 33w1d with an MANJINDER of 2021, by Ultrasound, was seen at [facility] LABOR DELIVERY for a nonstress test.    Chief Complaint   Patient presents with   • Abdominal Pain     C/O ABD AND BACK PAIN       Patient Active Problem List   Diagnosis   • Dehydration   • Mild hyperemesis gravidarum   • Urinary tract infection in mother during first trimester of pregnancy   • Abdominal pain affecting pregnancy   • UTI (urinary tract infection) during pregnancy   • Non-stress test reactive   • Kidney stone complicating pregnancy, third trimester       Start Time:   Stop Time:     Interpretation A  Nonstress Test Interpretation A: Reactive (20 : Laurel Sanchez, RN)  Comments A: Verified by MEE Pack RN (20 : Laurel Sanchez, RN)

## 2020-11-20 NOTE — PROGRESS NOTES
"Subjective    The patient has no complaints.  Tolerating oral intake.  Pain is controlled. +ambulation.  She had a little bit of back pain last night but it is completely resolved.             Objective     /56 (BP Location: Left arm, Patient Position: Lying)   Pulse 76   Temp 98 °F (36.7 °C) (Oral)   Resp 20   Ht 170.2 cm (67\")   Wt 99.8 kg (220 lb)   LMP 2020   SpO2 98%   Breastfeeding No   BMI 34.46 kg/m²   General:  alert, appears stated age and cooperative   Abdomen: soft, bowel sounds active, non-tender.  No costovertebral angle tenderness.   Extremities:   No cyanosis clubbing or edema, nontender         Assessment   25-year-old  2 para 1 at 33 weeks and 2 days  Pyelonephritis     Plan    We will go ahead and give her 1 more dose of Rocephin today and send her home with a prescription for Omnicef 300 mg p.o. twice daily for 7 days followed by Macrodantin suppression.  "

## 2020-11-20 NOTE — DISCHARGE SUMMARY
Date of Discharge: 11/20/20     Discharge Diagnosis:   Pyelonephritis  Abdominal pain in pregnancy    Problem List:    Abdominal pain affecting pregnancy    UTI (urinary tract infection) during pregnancy    Non-stress test reactive    Kidney stone complicating pregnancy, third trimester      Presenting Problem/History of Present Illness  Abdominal pain affecting pregnancy [O26.899, R10.9]  UTI (urinary tract infection) during pregnancy [O23.40]  Non-stress test reactive [Z36.89]      Hospital Course  Patient is a 25 y.o. female presented with the above diagnosis and underwent elective surgery.  She did well.     Procedures Performed         Consults:   Consults     No orders found for last 30 day(s).          Pertinent Test Results:    Condition on Discharge: Stable  Vital Signs  Temp:  [98 °F (36.7 °C)-98.5 °F (36.9 °C)] 98 °F (36.7 °C)  Heart Rate:  [76-83] 76  Resp:  [18-20] 20  BP: (110-115)/(56-58) 110/56    Discharge Disposition  Home or Self Care    Discharge Medications     Discharge Medications      New Medications      Instructions Start Date   cefdinir 300 MG capsule  Commonly known as: OMNICEF   300 mg, Oral, 2 Times Daily      nitrofurantoin 100 MG capsule  Commonly known as: Macrodantin   100 mg, Oral, 4 Times Daily         Continue These Medications      Instructions Start Date   metoclopramide 10 MG tablet  Commonly known as: Reglan   10 mg, Oral, Every 6 Hours PRN      pantoprazole 40 MG EC tablet  Commonly known as: PROTONIX   40 mg, Oral, Daily             Discharge Diet   Regular    Activity at Discharge  As tolerated    Follow-up Appointments  No future appointments.        Time: Discharge 15 min

## 2020-11-25 ENCOUNTER — APPOINTMENT (OUTPATIENT)
Dept: ULTRASOUND IMAGING | Facility: HOSPITAL | Age: 25
End: 2020-11-25

## 2020-11-25 ENCOUNTER — HOSPITAL ENCOUNTER (INPATIENT)
Facility: HOSPITAL | Age: 25
LOS: 1 days | Discharge: HOME | End: 2020-11-26
Attending: OBSTETRICS & GYNECOLOGY | Admitting: OBSTETRICS & GYNECOLOGY

## 2020-11-25 DIAGNOSIS — O26.833 KIDNEY STONE COMPLICATING PREGNANCY, THIRD TRIMESTER: ICD-10-CM

## 2020-11-25 DIAGNOSIS — R10.9 ABDOMINAL PAIN DURING PREGNANCY IN THIRD TRIMESTER: Primary | ICD-10-CM

## 2020-11-25 DIAGNOSIS — N20.0 KIDNEY STONE COMPLICATING PREGNANCY, THIRD TRIMESTER: ICD-10-CM

## 2020-11-25 DIAGNOSIS — O26.893 ABDOMINAL PAIN DURING PREGNANCY IN THIRD TRIMESTER: Primary | ICD-10-CM

## 2020-11-25 PROBLEM — N39.0 UTI (URINARY TRACT INFECTION): Status: ACTIVE | Noted: 2020-11-25

## 2020-11-25 LAB
6-ACETYL MORPHINE: NEGATIVE
AMPHET+METHAMPHET UR QL: NEGATIVE
BACTERIA UR QL AUTO: ABNORMAL /HPF
BARBITURATES UR QL SCN: NEGATIVE
BASOPHILS # BLD AUTO: 0.03 10*3/MM3 (ref 0–0.2)
BASOPHILS NFR BLD AUTO: 0.2 % (ref 0–1.5)
BENZODIAZ UR QL SCN: NEGATIVE
BILIRUB UR QL STRIP: NEGATIVE
BUPRENORPHINE SERPL-MCNC: NEGATIVE NG/ML
CANNABINOIDS SERPL QL: NEGATIVE
CLARITY UR: CLEAR
COCAINE UR QL: NEGATIVE
COLOR UR: YELLOW
DEPRECATED RDW RBC AUTO: 41.3 FL (ref 37–54)
EOSINOPHIL # BLD AUTO: 0.06 10*3/MM3 (ref 0–0.4)
EOSINOPHIL NFR BLD AUTO: 0.5 % (ref 0.3–6.2)
ERYTHROCYTE [DISTWIDTH] IN BLOOD BY AUTOMATED COUNT: 13.5 % (ref 12.3–15.4)
GLUCOSE UR STRIP-MCNC: NEGATIVE MG/DL
HCT VFR BLD AUTO: 33.5 % (ref 34–46.6)
HGB BLD-MCNC: 10.2 G/DL (ref 12–15.9)
HGB UR QL STRIP.AUTO: ABNORMAL
HYALINE CASTS UR QL AUTO: ABNORMAL /LPF
IMM GRANULOCYTES # BLD AUTO: 0.05 10*3/MM3 (ref 0–0.05)
IMM GRANULOCYTES NFR BLD AUTO: 0.4 % (ref 0–0.5)
KETONES UR QL STRIP: ABNORMAL
LEUKOCYTE ESTERASE UR QL STRIP.AUTO: ABNORMAL
LYMPHOCYTES # BLD AUTO: 2.02 10*3/MM3 (ref 0.7–3.1)
LYMPHOCYTES NFR BLD AUTO: 16.2 % (ref 19.6–45.3)
MCH RBC QN AUTO: 25.5 PG (ref 26.6–33)
MCHC RBC AUTO-ENTMCNC: 30.4 G/DL (ref 31.5–35.7)
MCV RBC AUTO: 83.8 FL (ref 79–97)
METHADONE UR QL SCN: NEGATIVE
MONOCYTES # BLD AUTO: 0.73 10*3/MM3 (ref 0.1–0.9)
MONOCYTES NFR BLD AUTO: 5.9 % (ref 5–12)
NEUTROPHILS NFR BLD AUTO: 76.8 % (ref 42.7–76)
NEUTROPHILS NFR BLD AUTO: 9.55 10*3/MM3 (ref 1.7–7)
NITRITE UR QL STRIP: NEGATIVE
NRBC BLD AUTO-RTO: 0 /100 WBC (ref 0–0.2)
OPIATES UR QL: NEGATIVE
OXYCODONE UR QL SCN: NEGATIVE
PCP UR QL SCN: NEGATIVE
PH UR STRIP.AUTO: 5.5 [PH] (ref 5–8)
PLATELET # BLD AUTO: 205 10*3/MM3 (ref 140–450)
PMV BLD AUTO: 11.1 FL (ref 6–12)
PROT UR QL STRIP: NEGATIVE
RBC # BLD AUTO: 4 10*6/MM3 (ref 3.77–5.28)
RBC # UR: ABNORMAL /HPF
REF LAB TEST METHOD: ABNORMAL
SP GR UR STRIP: 1.02 (ref 1–1.03)
SQUAMOUS #/AREA URNS HPF: ABNORMAL /HPF
UROBILINOGEN UR QL STRIP: ABNORMAL
WBC # BLD AUTO: 12.44 10*3/MM3 (ref 3.4–10.8)
WBC UR QL AUTO: ABNORMAL /HPF

## 2020-11-25 PROCEDURE — 80307 DRUG TEST PRSMV CHEM ANLYZR: CPT | Performed by: OBSTETRICS & GYNECOLOGY

## 2020-11-25 PROCEDURE — 76819 FETAL BIOPHYS PROFIL W/O NST: CPT | Performed by: RADIOLOGY

## 2020-11-25 PROCEDURE — 63710000001 PROMETHAZINE PER 25 MG: Performed by: OBSTETRICS & GYNECOLOGY

## 2020-11-25 PROCEDURE — 85025 COMPLETE CBC W/AUTO DIFF WBC: CPT | Performed by: OBSTETRICS & GYNECOLOGY

## 2020-11-25 PROCEDURE — 87086 URINE CULTURE/COLONY COUNT: CPT | Performed by: OBSTETRICS & GYNECOLOGY

## 2020-11-25 PROCEDURE — 59025 FETAL NON-STRESS TEST: CPT

## 2020-11-25 PROCEDURE — G0378 HOSPITAL OBSERVATION PER HR: HCPCS

## 2020-11-25 PROCEDURE — 76775 US EXAM ABDO BACK WALL LIM: CPT | Performed by: RADIOLOGY

## 2020-11-25 PROCEDURE — 25010000002 BUTORPHANOL PER 1 MG: Performed by: OBSTETRICS & GYNECOLOGY

## 2020-11-25 PROCEDURE — 25010000002 ONDANSETRON PER 1 MG: Performed by: OBSTETRICS & GYNECOLOGY

## 2020-11-25 PROCEDURE — 76819 FETAL BIOPHYS PROFIL W/O NST: CPT

## 2020-11-25 PROCEDURE — 81001 URINALYSIS AUTO W/SCOPE: CPT | Performed by: OBSTETRICS & GYNECOLOGY

## 2020-11-25 PROCEDURE — 76775 US EXAM ABDO BACK WALL LIM: CPT

## 2020-11-25 PROCEDURE — 36415 COLL VENOUS BLD VENIPUNCTURE: CPT | Performed by: OBSTETRICS & GYNECOLOGY

## 2020-11-25 PROCEDURE — G0463 HOSPITAL OUTPT CLINIC VISIT: HCPCS

## 2020-11-25 PROCEDURE — P9612 CATHETERIZE FOR URINE SPEC: HCPCS

## 2020-11-25 PROCEDURE — 25010000002 CEFTRIAXONE PER 250 MG: Performed by: OBSTETRICS & GYNECOLOGY

## 2020-11-25 PROCEDURE — 25010000002 HYDROMORPHONE 1 MG/ML SOLUTION: Performed by: OBSTETRICS & GYNECOLOGY

## 2020-11-25 PROCEDURE — 25010000002 TERBUTALINE PER 1 MG: Performed by: OBSTETRICS & GYNECOLOGY

## 2020-11-25 RX ORDER — NITROFURANTOIN MACROCRYSTALS 50 MG/1
100 CAPSULE ORAL NIGHTLY
Status: DISCONTINUED | OUTPATIENT
Start: 2020-11-27 | End: 2020-11-25

## 2020-11-25 RX ORDER — BUTORPHANOL TARTRATE 1 MG/ML
1 INJECTION, SOLUTION INTRAMUSCULAR; INTRAVENOUS
Status: DISCONTINUED | OUTPATIENT
Start: 2020-11-25 | End: 2020-11-25

## 2020-11-25 RX ORDER — ONDANSETRON 2 MG/ML
4 INJECTION INTRAMUSCULAR; INTRAVENOUS EVERY 6 HOURS PRN
Status: DISCONTINUED | OUTPATIENT
Start: 2020-11-25 | End: 2020-11-26 | Stop reason: HOSPADM

## 2020-11-25 RX ORDER — METOCLOPRAMIDE 10 MG/1
10 TABLET ORAL EVERY 6 HOURS PRN
Status: DISCONTINUED | OUTPATIENT
Start: 2020-11-25 | End: 2020-11-26 | Stop reason: HOSPADM

## 2020-11-25 RX ORDER — PANTOPRAZOLE SODIUM 40 MG/1
40 TABLET, DELAYED RELEASE ORAL DAILY
Status: DISCONTINUED | OUTPATIENT
Start: 2020-11-25 | End: 2020-11-26 | Stop reason: HOSPADM

## 2020-11-25 RX ORDER — CEFDINIR 300 MG/1
300 CAPSULE ORAL 2 TIMES DAILY
Status: DISCONTINUED | OUTPATIENT
Start: 2020-11-25 | End: 2020-11-25

## 2020-11-25 RX ORDER — SODIUM CHLORIDE 9 MG/ML
125 INJECTION, SOLUTION INTRAVENOUS CONTINUOUS
Status: DISCONTINUED | OUTPATIENT
Start: 2020-11-25 | End: 2020-11-26 | Stop reason: HOSPADM

## 2020-11-25 RX ORDER — PROMETHAZINE HYDROCHLORIDE 25 MG/1
25 TABLET ORAL EVERY 4 HOURS PRN
Status: DISCONTINUED | OUTPATIENT
Start: 2020-11-25 | End: 2020-11-26 | Stop reason: HOSPADM

## 2020-11-25 RX ORDER — TERBUTALINE SULFATE 1 MG/ML
0.25 INJECTION, SOLUTION SUBCUTANEOUS ONCE
Status: COMPLETED | OUTPATIENT
Start: 2020-11-25 | End: 2020-11-25

## 2020-11-25 RX ADMIN — SODIUM CHLORIDE 125 ML/HR: 9 INJECTION, SOLUTION INTRAVENOUS at 15:46

## 2020-11-25 RX ADMIN — HYDROMORPHONE HYDROCHLORIDE 1 MG: 1 INJECTION, SOLUTION INTRAMUSCULAR; INTRAVENOUS; SUBCUTANEOUS at 15:46

## 2020-11-25 RX ADMIN — HYDROMORPHONE HYDROCHLORIDE 1 MG: 1 INJECTION, SOLUTION INTRAMUSCULAR; INTRAVENOUS; SUBCUTANEOUS at 09:23

## 2020-11-25 RX ADMIN — BUTORPHANOL TARTRATE 1 MG: 1 INJECTION, SOLUTION INTRAMUSCULAR; INTRAVENOUS at 01:57

## 2020-11-25 RX ADMIN — TERBUTALINE SULFATE 0.25 MG: 1 INJECTION SUBCUTANEOUS at 01:58

## 2020-11-25 RX ADMIN — ONDANSETRON 4 MG: 2 INJECTION INTRAMUSCULAR; INTRAVENOUS at 18:58

## 2020-11-25 RX ADMIN — CEFTRIAXONE 2 G: 2 INJECTION, POWDER, FOR SOLUTION INTRAMUSCULAR; INTRAVENOUS at 04:29

## 2020-11-25 RX ADMIN — PANTOPRAZOLE SODIUM 40 MG: 40 TABLET, DELAYED RELEASE ORAL at 08:38

## 2020-11-25 RX ADMIN — SODIUM CHLORIDE 125 ML/HR: 9 INJECTION, SOLUTION INTRAVENOUS at 09:23

## 2020-11-25 RX ADMIN — ONDANSETRON 4 MG: 2 INJECTION INTRAMUSCULAR; INTRAVENOUS at 01:47

## 2020-11-25 RX ADMIN — BUTORPHANOL TARTRATE 2 MG: 2 INJECTION, SOLUTION INTRAMUSCULAR; INTRAVENOUS at 03:36

## 2020-11-25 RX ADMIN — HYDROMORPHONE HYDROCHLORIDE 1 MG: 1 INJECTION, SOLUTION INTRAMUSCULAR; INTRAVENOUS; SUBCUTANEOUS at 18:58

## 2020-11-25 RX ADMIN — HYDROMORPHONE HYDROCHLORIDE 1 MG: 1 INJECTION, SOLUTION INTRAMUSCULAR; INTRAVENOUS; SUBCUTANEOUS at 11:58

## 2020-11-25 RX ADMIN — SODIUM CHLORIDE 1000 ML: 9 INJECTION, SOLUTION INTRAVENOUS at 01:45

## 2020-11-25 RX ADMIN — HYDROMORPHONE HYDROCHLORIDE 1 MG: 1 INJECTION, SOLUTION INTRAMUSCULAR; INTRAVENOUS; SUBCUTANEOUS at 21:09

## 2020-11-25 RX ADMIN — SODIUM CHLORIDE 125 ML/HR: 9 INJECTION, SOLUTION INTRAVENOUS at 03:00

## 2020-11-25 RX ADMIN — BUTORPHANOL TARTRATE 2 MG: 2 INJECTION, SOLUTION INTRAMUSCULAR; INTRAVENOUS at 06:02

## 2020-11-25 RX ADMIN — PROMETHAZINE HYDROCHLORIDE 25 MG: 25 TABLET ORAL at 03:36

## 2020-11-26 VITALS
SYSTOLIC BLOOD PRESSURE: 123 MMHG | HEIGHT: 67 IN | BODY MASS INDEX: 35.16 KG/M2 | TEMPERATURE: 98.6 F | OXYGEN SATURATION: 96 % | HEART RATE: 91 BPM | DIASTOLIC BLOOD PRESSURE: 68 MMHG | WEIGHT: 224 LBS | RESPIRATION RATE: 20 BRPM

## 2020-11-26 LAB
BACTERIA SPEC AEROBE CULT: NO GROWTH
BASOPHILS # BLD AUTO: 0.04 10*3/MM3 (ref 0–0.2)
BASOPHILS NFR BLD AUTO: 0.3 % (ref 0–1.5)
DEPRECATED RDW RBC AUTO: 41.3 FL (ref 37–54)
EOSINOPHIL # BLD AUTO: 0.01 10*3/MM3 (ref 0–0.4)
EOSINOPHIL NFR BLD AUTO: 0.1 % (ref 0.3–6.2)
ERYTHROCYTE [DISTWIDTH] IN BLOOD BY AUTOMATED COUNT: 13.8 % (ref 12.3–15.4)
HCT VFR BLD AUTO: 30.8 % (ref 34–46.6)
HGB BLD-MCNC: 9.6 G/DL (ref 12–15.9)
IMM GRANULOCYTES # BLD AUTO: 0.05 10*3/MM3 (ref 0–0.05)
IMM GRANULOCYTES NFR BLD AUTO: 0.4 % (ref 0–0.5)
LYMPHOCYTES # BLD AUTO: 1.25 10*3/MM3 (ref 0.7–3.1)
LYMPHOCYTES NFR BLD AUTO: 10.6 % (ref 19.6–45.3)
MCH RBC QN AUTO: 25.9 PG (ref 26.6–33)
MCHC RBC AUTO-ENTMCNC: 31.2 G/DL (ref 31.5–35.7)
MCV RBC AUTO: 83.2 FL (ref 79–97)
MONOCYTES # BLD AUTO: 0.92 10*3/MM3 (ref 0.1–0.9)
MONOCYTES NFR BLD AUTO: 7.8 % (ref 5–12)
NEUTROPHILS NFR BLD AUTO: 80.8 % (ref 42.7–76)
NEUTROPHILS NFR BLD AUTO: 9.52 10*3/MM3 (ref 1.7–7)
NRBC BLD AUTO-RTO: 0 /100 WBC (ref 0–0.2)
PLATELET # BLD AUTO: 189 10*3/MM3 (ref 140–450)
PMV BLD AUTO: 10.4 FL (ref 6–12)
RBC # BLD AUTO: 3.7 10*6/MM3 (ref 3.77–5.28)
WBC # BLD AUTO: 11.79 10*3/MM3 (ref 3.4–10.8)

## 2020-11-26 PROCEDURE — 25010000002 CEFTRIAXONE PER 250 MG: Performed by: OBSTETRICS & GYNECOLOGY

## 2020-11-26 PROCEDURE — 25010000002 PROMETHAZINE PER 50 MG: Performed by: OBSTETRICS & GYNECOLOGY

## 2020-11-26 PROCEDURE — 25010000002 HYDROMORPHONE 1 MG/ML SOLUTION: Performed by: OBSTETRICS & GYNECOLOGY

## 2020-11-26 PROCEDURE — 85025 COMPLETE CBC W/AUTO DIFF WBC: CPT | Performed by: OBSTETRICS & GYNECOLOGY

## 2020-11-26 PROCEDURE — 63710000001 PROMETHAZINE PER 25 MG: Performed by: OBSTETRICS & GYNECOLOGY

## 2020-11-26 PROCEDURE — G0378 HOSPITAL OBSERVATION PER HR: HCPCS

## 2020-11-26 PROCEDURE — 25010000002 ONDANSETRON PER 1 MG: Performed by: OBSTETRICS & GYNECOLOGY

## 2020-11-26 RX ORDER — OXYCODONE HYDROCHLORIDE AND ACETAMINOPHEN 5; 325 MG/1; MG/1
1 TABLET ORAL EVERY 4 HOURS PRN
Status: DISCONTINUED | OUTPATIENT
Start: 2020-11-26 | End: 2020-11-26 | Stop reason: HOSPADM

## 2020-11-26 RX ORDER — OXYCODONE HYDROCHLORIDE 5 MG/1
5 TABLET ORAL EVERY 4 HOURS PRN
Qty: 20 TABLET | Refills: 0 | Status: SHIPPED | OUTPATIENT
Start: 2020-11-26 | End: 2020-11-30 | Stop reason: HOSPADM

## 2020-11-26 RX ORDER — TAMSULOSIN HYDROCHLORIDE 0.4 MG/1
0.4 CAPSULE ORAL DAILY
Status: DISCONTINUED | OUTPATIENT
Start: 2020-11-26 | End: 2020-11-26 | Stop reason: HOSPADM

## 2020-11-26 RX ORDER — TAMSULOSIN HYDROCHLORIDE 0.4 MG/1
1 CAPSULE ORAL DAILY
Qty: 14 CAPSULE | Refills: 0 | Status: ON HOLD | OUTPATIENT
Start: 2020-11-26 | End: 2020-12-09

## 2020-11-26 RX ADMIN — HYDROMORPHONE HYDROCHLORIDE 1 MG: 1 INJECTION, SOLUTION INTRAMUSCULAR; INTRAVENOUS; SUBCUTANEOUS at 03:55

## 2020-11-26 RX ADMIN — ONDANSETRON 4 MG: 2 INJECTION INTRAMUSCULAR; INTRAVENOUS at 03:55

## 2020-11-26 RX ADMIN — HYDROMORPHONE HYDROCHLORIDE 1 MG: 1 INJECTION, SOLUTION INTRAMUSCULAR; INTRAVENOUS; SUBCUTANEOUS at 07:20

## 2020-11-26 RX ADMIN — HYDROMORPHONE HYDROCHLORIDE 1 MG: 1 INJECTION, SOLUTION INTRAMUSCULAR; INTRAVENOUS; SUBCUTANEOUS at 09:47

## 2020-11-26 RX ADMIN — METOCLOPRAMIDE 10 MG: 10 TABLET ORAL at 07:33

## 2020-11-26 RX ADMIN — SODIUM CHLORIDE 125 ML/HR: 9 INJECTION, SOLUTION INTRAVENOUS at 07:18

## 2020-11-26 RX ADMIN — PANTOPRAZOLE SODIUM 40 MG: 40 TABLET, DELAYED RELEASE ORAL at 08:27

## 2020-11-26 RX ADMIN — PROMETHAZINE HYDROCHLORIDE 25 MG: 25 INJECTION INTRAMUSCULAR; INTRAVENOUS at 09:47

## 2020-11-26 RX ADMIN — PROMETHAZINE HYDROCHLORIDE 25 MG: 25 TABLET ORAL at 00:10

## 2020-11-26 RX ADMIN — TAMSULOSIN HYDROCHLORIDE 0.4 MG: 0.4 CAPSULE ORAL at 12:03

## 2020-11-26 RX ADMIN — CEFTRIAXONE 2 G: 2 INJECTION, POWDER, FOR SOLUTION INTRAMUSCULAR; INTRAVENOUS at 03:55

## 2020-11-26 RX ADMIN — SODIUM CHLORIDE 125 ML/HR: 9 INJECTION, SOLUTION INTRAVENOUS at 00:08

## 2020-11-26 RX ADMIN — HYDROMORPHONE HYDROCHLORIDE 1 MG: 1 INJECTION, SOLUTION INTRAMUSCULAR; INTRAVENOUS; SUBCUTANEOUS at 00:08

## 2020-11-27 ENCOUNTER — HOSPITAL ENCOUNTER (INPATIENT)
Facility: HOSPITAL | Age: 25
LOS: 3 days | Discharge: HOME | End: 2020-11-30
Attending: OBSTETRICS & GYNECOLOGY | Admitting: OBSTETRICS & GYNECOLOGY

## 2020-11-27 DIAGNOSIS — N20.0 RENAL CALCULI: Primary | ICD-10-CM

## 2020-11-27 PROBLEM — Z34.90 PREGNANCY: Status: ACTIVE | Noted: 2020-11-27

## 2020-11-27 PROCEDURE — 87635 SARS-COV-2 COVID-19 AMP PRB: CPT | Performed by: OBSTETRICS & GYNECOLOGY

## 2020-11-27 RX ORDER — SODIUM CHLORIDE 0.9 % (FLUSH) 0.9 %
10 SYRINGE (ML) INJECTION AS NEEDED
Status: DISCONTINUED | OUTPATIENT
Start: 2020-11-27 | End: 2020-11-30 | Stop reason: HOSPADM

## 2020-11-27 RX ORDER — SODIUM CHLORIDE 0.9 % (FLUSH) 0.9 %
10 SYRINGE (ML) INJECTION EVERY 12 HOURS SCHEDULED
Status: DISCONTINUED | OUTPATIENT
Start: 2020-11-28 | End: 2020-11-30 | Stop reason: HOSPADM

## 2020-11-27 RX ORDER — OXYCODONE HYDROCHLORIDE 5 MG/1
5 TABLET ORAL EVERY 6 HOURS PRN
Status: DISCONTINUED | OUTPATIENT
Start: 2020-11-27 | End: 2020-11-30 | Stop reason: HOSPADM

## 2020-11-27 RX ORDER — SODIUM CHLORIDE, SODIUM LACTATE, POTASSIUM CHLORIDE, CALCIUM CHLORIDE 600; 310; 30; 20 MG/100ML; MG/100ML; MG/100ML; MG/100ML
150 INJECTION, SOLUTION INTRAVENOUS CONTINUOUS
Status: DISCONTINUED | OUTPATIENT
Start: 2020-11-28 | End: 2020-11-30 | Stop reason: HOSPADM

## 2020-11-27 RX ORDER — TAMSULOSIN HYDROCHLORIDE 0.4 MG/1
0.4 CAPSULE ORAL DAILY
Status: DISCONTINUED | OUTPATIENT
Start: 2020-11-28 | End: 2020-11-30 | Stop reason: HOSPADM

## 2020-11-27 RX ORDER — ONDANSETRON 2 MG/ML
4 INJECTION INTRAMUSCULAR; INTRAVENOUS EVERY 6 HOURS PRN
Status: DISCONTINUED | OUTPATIENT
Start: 2020-11-27 | End: 2020-11-30 | Stop reason: HOSPADM

## 2020-11-28 LAB — SARS-COV-2 RNA RESP QL NAA+PROBE: NOT DETECTED

## 2020-11-28 PROCEDURE — 25010000002 ONDANSETRON PER 1 MG: Performed by: OBSTETRICS & GYNECOLOGY

## 2020-11-28 PROCEDURE — 25010000002 BETAMETHASONE ACET & SOD PHOS PER 4 MG: Performed by: OBSTETRICS & GYNECOLOGY

## 2020-11-28 PROCEDURE — G0378 HOSPITAL OBSERVATION PER HR: HCPCS

## 2020-11-28 PROCEDURE — 25010000002 TERBUTALINE PER 1 MG

## 2020-11-28 PROCEDURE — 59025 FETAL NON-STRESS TEST: CPT

## 2020-11-28 RX ORDER — BETAMETHASONE SODIUM PHOSPHATE AND BETAMETHASONE ACETATE 3; 3 MG/ML; MG/ML
12 INJECTION, SUSPENSION INTRA-ARTICULAR; INTRALESIONAL; INTRAMUSCULAR; SOFT TISSUE EVERY 24 HOURS
Status: COMPLETED | OUTPATIENT
Start: 2020-11-28 | End: 2020-11-29

## 2020-11-28 RX ORDER — OXYCODONE HYDROCHLORIDE 5 MG/1
5 TABLET ORAL EVERY 4 HOURS PRN
Status: CANCELLED | OUTPATIENT
Start: 2020-11-28

## 2020-11-28 RX ORDER — TERBUTALINE SULFATE 1 MG/ML
INJECTION, SOLUTION SUBCUTANEOUS
Status: COMPLETED
Start: 2020-11-28 | End: 2020-11-28

## 2020-11-28 RX ORDER — CALCIUM CARBONATE 200(500)MG
2 TABLET,CHEWABLE ORAL 3 TIMES DAILY PRN
Status: DISCONTINUED | OUTPATIENT
Start: 2020-11-28 | End: 2020-11-30 | Stop reason: HOSPADM

## 2020-11-28 RX ORDER — TERBUTALINE SULFATE 1 MG/ML
0.25 INJECTION, SOLUTION SUBCUTANEOUS
Status: DISCONTINUED | OUTPATIENT
Start: 2020-11-28 | End: 2020-11-30 | Stop reason: HOSPADM

## 2020-11-28 RX ORDER — TAMSULOSIN HYDROCHLORIDE 0.4 MG/1
0.4 CAPSULE ORAL DAILY
Status: CANCELLED | OUTPATIENT
Start: 2020-11-28 | End: 2020-12-10

## 2020-11-28 RX ADMIN — SODIUM CHLORIDE, POTASSIUM CHLORIDE, SODIUM LACTATE AND CALCIUM CHLORIDE 150 ML/HR: 600; 310; 30; 20 INJECTION, SOLUTION INTRAVENOUS at 00:01

## 2020-11-28 RX ADMIN — OXYCODONE HYDROCHLORIDE 5 MG: 5 TABLET ORAL at 00:05

## 2020-11-28 RX ADMIN — SODIUM CHLORIDE, PRESERVATIVE FREE 10 ML: 5 INJECTION INTRAVENOUS at 08:26

## 2020-11-28 RX ADMIN — Medication 2 TABLET: at 20:33

## 2020-11-28 RX ADMIN — TERBUTALINE SULFATE 0.25 MG: 1 INJECTION SUBCUTANEOUS at 08:38

## 2020-11-28 RX ADMIN — TAMSULOSIN HYDROCHLORIDE 0.4 MG: 0.4 CAPSULE ORAL at 08:23

## 2020-11-28 RX ADMIN — OXYCODONE HYDROCHLORIDE 5 MG: 5 TABLET ORAL at 20:34

## 2020-11-28 RX ADMIN — TERBUTALINE SULFATE 0.25 MG: 1 INJECTION, SOLUTION SUBCUTANEOUS at 08:38

## 2020-11-28 RX ADMIN — SODIUM CHLORIDE, POTASSIUM CHLORIDE, SODIUM LACTATE AND CALCIUM CHLORIDE 150 ML/HR: 600; 310; 30; 20 INJECTION, SOLUTION INTRAVENOUS at 12:43

## 2020-11-28 RX ADMIN — OXYCODONE HYDROCHLORIDE 5 MG: 5 TABLET ORAL at 08:23

## 2020-11-28 RX ADMIN — BETAMETHASONE SODIUM PHOSPHATE AND BETAMETHASONE ACETATE 12 MG: 3; 3 INJECTION, SUSPENSION INTRA-ARTICULAR; INTRALESIONAL; INTRAMUSCULAR; SOFT TISSUE at 12:43

## 2020-11-28 RX ADMIN — ONDANSETRON 4 MG: 2 INJECTION INTRAMUSCULAR; INTRAVENOUS at 00:30

## 2020-11-28 RX ADMIN — SODIUM CHLORIDE, POTASSIUM CHLORIDE, SODIUM LACTATE AND CALCIUM CHLORIDE 150 ML/HR: 600; 310; 30; 20 INJECTION, SOLUTION INTRAVENOUS at 08:24

## 2020-11-28 RX ADMIN — SODIUM CHLORIDE, POTASSIUM CHLORIDE, SODIUM LACTATE AND CALCIUM CHLORIDE 150 ML/HR: 600; 310; 30; 20 INJECTION, SOLUTION INTRAVENOUS at 20:33

## 2020-11-29 PROBLEM — N20.0 RENAL CALCULI: Status: ACTIVE | Noted: 2020-11-29

## 2020-11-29 PROCEDURE — 25010000002 TERBUTALINE PER 1 MG: Performed by: OBSTETRICS & GYNECOLOGY

## 2020-11-29 PROCEDURE — 25010000002 BETAMETHASONE ACET & SOD PHOS PER 4 MG: Performed by: OBSTETRICS & GYNECOLOGY

## 2020-11-29 PROCEDURE — 59025 FETAL NON-STRESS TEST: CPT

## 2020-11-29 RX ADMIN — SODIUM CHLORIDE, POTASSIUM CHLORIDE, SODIUM LACTATE AND CALCIUM CHLORIDE 150 ML/HR: 600; 310; 30; 20 INJECTION, SOLUTION INTRAVENOUS at 10:58

## 2020-11-29 RX ADMIN — OXYCODONE HYDROCHLORIDE 5 MG: 5 TABLET ORAL at 10:58

## 2020-11-29 RX ADMIN — TERBUTALINE SULFATE 0.25 MG: 1 INJECTION, SOLUTION SUBCUTANEOUS at 05:35

## 2020-11-29 RX ADMIN — SODIUM CHLORIDE, POTASSIUM CHLORIDE, SODIUM LACTATE AND CALCIUM CHLORIDE 150 ML/HR: 600; 310; 30; 20 INJECTION, SOLUTION INTRAVENOUS at 05:00

## 2020-11-29 RX ADMIN — TAMSULOSIN HYDROCHLORIDE 0.4 MG: 0.4 CAPSULE ORAL at 08:34

## 2020-11-29 RX ADMIN — BETAMETHASONE SODIUM PHOSPHATE AND BETAMETHASONE ACETATE 12 MG: 3; 3 INJECTION, SUSPENSION INTRA-ARTICULAR; INTRALESIONAL; INTRAMUSCULAR; SOFT TISSUE at 13:02

## 2020-11-29 RX ADMIN — OXYCODONE HYDROCHLORIDE 5 MG: 5 TABLET ORAL at 05:00

## 2020-11-29 RX ADMIN — OXYCODONE HYDROCHLORIDE 5 MG: 5 TABLET ORAL at 17:08

## 2020-11-29 RX ADMIN — SODIUM CHLORIDE, POTASSIUM CHLORIDE, SODIUM LACTATE AND CALCIUM CHLORIDE 150 ML/HR: 600; 310; 30; 20 INJECTION, SOLUTION INTRAVENOUS at 18:46

## 2020-11-30 VITALS
WEIGHT: 224 LBS | BODY MASS INDEX: 35.16 KG/M2 | RESPIRATION RATE: 20 BRPM | SYSTOLIC BLOOD PRESSURE: 124 MMHG | DIASTOLIC BLOOD PRESSURE: 69 MMHG | TEMPERATURE: 98.5 F | HEART RATE: 92 BPM | HEIGHT: 67 IN | OXYGEN SATURATION: 98 %

## 2020-11-30 PROCEDURE — 59025 FETAL NON-STRESS TEST: CPT

## 2020-11-30 PROCEDURE — 99222 1ST HOSP IP/OBS MODERATE 55: CPT | Performed by: UROLOGY

## 2020-11-30 RX ORDER — PANTOPRAZOLE SODIUM 40 MG/1
40 TABLET, DELAYED RELEASE ORAL DAILY
Status: DISCONTINUED | OUTPATIENT
Start: 2020-11-30 | End: 2020-11-30 | Stop reason: HOSPADM

## 2020-11-30 RX ORDER — NITROFURANTOIN MACROCRYSTALS 100 MG/1
100 CAPSULE ORAL NIGHTLY
Status: DISCONTINUED | OUTPATIENT
Start: 2020-11-30 | End: 2020-11-30 | Stop reason: HOSPADM

## 2020-11-30 RX ORDER — NITROFURANTOIN MACROCRYSTALS 50 MG/1
100 CAPSULE ORAL NIGHTLY
Status: DISCONTINUED | OUTPATIENT
Start: 2020-11-30 | End: 2020-11-30

## 2020-11-30 RX ORDER — OXYCODONE HYDROCHLORIDE AND ACETAMINOPHEN 5; 325 MG/1; MG/1
TABLET ORAL
Qty: 28 TABLET | Refills: 0 | Status: ON HOLD | OUTPATIENT
Start: 2020-11-30 | End: 2020-12-09

## 2020-11-30 RX ADMIN — OXYCODONE HYDROCHLORIDE 5 MG: 5 TABLET ORAL at 08:00

## 2020-11-30 RX ADMIN — SODIUM CHLORIDE, POTASSIUM CHLORIDE, SODIUM LACTATE AND CALCIUM CHLORIDE 150 ML/HR: 600; 310; 30; 20 INJECTION, SOLUTION INTRAVENOUS at 08:00

## 2020-11-30 RX ADMIN — OXYCODONE HYDROCHLORIDE 5 MG: 5 TABLET ORAL at 14:47

## 2020-11-30 RX ADMIN — TAMSULOSIN HYDROCHLORIDE 0.4 MG: 0.4 CAPSULE ORAL at 07:59

## 2020-12-01 NOTE — PAYOR COMM NOTE
"[facility]  NPI:[id number]    Utilization Review  Contact: Ana Maria Bueno RN  Phone: [phone]  Fax:[phone]    DISCHARGE NOTIFICATION    Kenia Pollack (25 y.o. Female)     Date of Birth Social Security Number Address Home Phone MRN    1995  [id number] [place] [id number] [phone] [id number]    Shinto Marital Status          Latter-day        Admission Date Admission Type Admitting Provider Attending Provider Department, Room/Bed    11/27/20 Urgent Johanny Murrieta DO  [facility] LABOR DELIVERY, L227/1    Discharge Date Discharge Disposition Discharge Destination        11/30/2020 Home or Self Care              Attending Provider: (none)   Allergies: No Known Allergies    Isolation: None   Infection: None   Code Status: Prior    Ht: 170.2 cm (67\")   Wt: 102 kg (224 lb)    Admission Cmt: None   Principal Problem: None                Active Insurance as of 11/27/2020     Primary Coverage     Payor Plan Insurance Group Employer/Plan Group    [facility] [employer] [facility]      Payor Plan Address Payor Plan Phone Number Payor Plan Fax Number Effective Dates    [address] [address]   7/1/2016 - None Entered    [place] [address]       Subscriber Name Subscriber Birth Date Member ID       KENIA POLLACK 1995 [id number]                 Emergency Contacts      (Rel.) Home Phone Work Phone Mobile Phone    Anny Pollack (Mother) [phone] -- --    Everardo Mcdonald (Spouse) [phone] -- --            Discharge Summary    No notes of this type exist for this encounter.         "

## 2020-12-09 ENCOUNTER — ANESTHESIA EVENT (OUTPATIENT)
Dept: LABOR AND DELIVERY | Facility: HOSPITAL | Age: 25
End: 2020-12-09

## 2020-12-09 ENCOUNTER — HOSPITAL ENCOUNTER (INPATIENT)
Facility: HOSPITAL | Age: 25
LOS: 2 days | Discharge: HOME | End: 2020-12-11
Attending: OBSTETRICS & GYNECOLOGY | Admitting: OBSTETRICS & GYNECOLOGY

## 2020-12-09 ENCOUNTER — ANESTHESIA (OUTPATIENT)
Dept: LABOR AND DELIVERY | Facility: HOSPITAL | Age: 25
End: 2020-12-09

## 2020-12-09 PROBLEM — O42.90 AMNIOTIC FLUID LEAKING: Status: ACTIVE | Noted: 2020-12-09

## 2020-12-09 LAB
6-ACETYL MORPHINE: NEGATIVE
A1 MICROGLOB PLACENTAL VAG QL: POSITIVE
ABO GROUP BLD: NORMAL
AMPHET+METHAMPHET UR QL: NEGATIVE
BARBITURATES UR QL SCN: NEGATIVE
BASOPHILS # BLD AUTO: 0.04 10*3/MM3 (ref 0–0.2)
BASOPHILS NFR BLD AUTO: 0.3 % (ref 0–1.5)
BENZODIAZ UR QL SCN: NEGATIVE
BLD GP AB SCN SERPL QL: NEGATIVE
BUPRENORPHINE SERPL-MCNC: NEGATIVE NG/ML
CANNABINOIDS SERPL QL: NEGATIVE
COCAINE UR QL: NEGATIVE
DEPRECATED RDW RBC AUTO: 42 FL (ref 37–54)
EOSINOPHIL # BLD AUTO: 0.03 10*3/MM3 (ref 0–0.4)
EOSINOPHIL NFR BLD AUTO: 0.2 % (ref 0.3–6.2)
ERYTHROCYTE [DISTWIDTH] IN BLOOD BY AUTOMATED COUNT: 14.3 % (ref 12.3–15.4)
HCT VFR BLD AUTO: 30.9 % (ref 34–46.6)
HGB BLD-MCNC: 9.2 G/DL (ref 12–15.9)
IMM GRANULOCYTES # BLD AUTO: 0.1 10*3/MM3 (ref 0–0.05)
IMM GRANULOCYTES NFR BLD AUTO: 0.8 % (ref 0–0.5)
LYMPHOCYTES # BLD AUTO: 1.95 10*3/MM3 (ref 0.7–3.1)
LYMPHOCYTES NFR BLD AUTO: 14.7 % (ref 19.6–45.3)
MCH RBC QN AUTO: 24.5 PG (ref 26.6–33)
MCHC RBC AUTO-ENTMCNC: 29.8 G/DL (ref 31.5–35.7)
MCV RBC AUTO: 82.2 FL (ref 79–97)
METHADONE UR QL SCN: NEGATIVE
MONOCYTES # BLD AUTO: 0.81 10*3/MM3 (ref 0.1–0.9)
MONOCYTES NFR BLD AUTO: 6.1 % (ref 5–12)
NEUTROPHILS NFR BLD AUTO: 10.38 10*3/MM3 (ref 1.7–7)
NEUTROPHILS NFR BLD AUTO: 77.9 % (ref 42.7–76)
NRBC BLD AUTO-RTO: 0 /100 WBC (ref 0–0.2)
OPIATES UR QL: NEGATIVE
OXYCODONE UR QL SCN: NEGATIVE
PCP UR QL SCN: NEGATIVE
PLATELET # BLD AUTO: 263 10*3/MM3 (ref 140–450)
PMV BLD AUTO: 10.2 FL (ref 6–12)
RBC # BLD AUTO: 3.76 10*6/MM3 (ref 3.77–5.28)
RH BLD: POSITIVE
SARS-COV-2 RNA RESP QL NAA+PROBE: NOT DETECTED
T&S EXPIRATION DATE: NORMAL
WBC # BLD AUTO: 13.31 10*3/MM3 (ref 3.4–10.8)

## 2020-12-09 PROCEDURE — 80307 DRUG TEST PRSMV CHEM ANLYZR: CPT | Performed by: OBSTETRICS & GYNECOLOGY

## 2020-12-09 PROCEDURE — U0003 INFECTIOUS AGENT DETECTION BY NUCLEIC ACID (DNA OR RNA); SEVERE ACUTE RESPIRATORY SYNDROME CORONAVIRUS 2 (SARS-COV-2) (CORONAVIRUS DISEASE [COVID-19]), AMPLIFIED PROBE TECHNIQUE, MAKING USE OF HIGH THROUGHPUT TECHNOLOGIES AS DESCRIBED BY CMS-2020-01-R: HCPCS | Performed by: OBSTETRICS & GYNECOLOGY

## 2020-12-09 PROCEDURE — 25010000002 ONDANSETRON PER 1 MG: Performed by: NURSE ANESTHETIST, CERTIFIED REGISTERED

## 2020-12-09 PROCEDURE — 0UB70ZZ EXCISION OF BILATERAL FALLOPIAN TUBES, OPEN APPROACH: ICD-10-PCS | Performed by: OBSTETRICS & GYNECOLOGY

## 2020-12-09 PROCEDURE — 85025 COMPLETE CBC W/AUTO DIFF WBC: CPT | Performed by: OBSTETRICS & GYNECOLOGY

## 2020-12-09 PROCEDURE — 86850 RBC ANTIBODY SCREEN: CPT | Performed by: OBSTETRICS & GYNECOLOGY

## 2020-12-09 PROCEDURE — 25010000002 TERBUTALINE PER 1 MG

## 2020-12-09 PROCEDURE — 86901 BLOOD TYPING SEROLOGIC RH(D): CPT | Performed by: OBSTETRICS & GYNECOLOGY

## 2020-12-09 PROCEDURE — 25010000002 FENTANYL CITRATE (PF) 100 MCG/2ML SOLUTION: Performed by: NURSE ANESTHETIST, CERTIFIED REGISTERED

## 2020-12-09 PROCEDURE — 25010000003 CEFAZOLIN SODIUM-DEXTROSE 2-3 GM-%(50ML) RECONSTITUTED SOLUTION: Performed by: OBSTETRICS & GYNECOLOGY

## 2020-12-09 PROCEDURE — 25010000003 MORPHINE PER 10 MG: Performed by: NURSE ANESTHETIST, CERTIFIED REGISTERED

## 2020-12-09 PROCEDURE — 25010000002 HYDROMORPHONE PER 4 MG: Performed by: OBSTETRICS & GYNECOLOGY

## 2020-12-09 PROCEDURE — 59025 FETAL NON-STRESS TEST: CPT

## 2020-12-09 PROCEDURE — 86900 BLOOD TYPING SEROLOGIC ABO: CPT | Performed by: OBSTETRICS & GYNECOLOGY

## 2020-12-09 PROCEDURE — 25010000002 KETOROLAC TROMETHAMINE PER 15 MG: Performed by: OBSTETRICS & GYNECOLOGY

## 2020-12-09 PROCEDURE — 84112 EVAL AMNIOTIC FLUID PROTEIN: CPT | Performed by: OBSTETRICS & GYNECOLOGY

## 2020-12-09 DEVICE — CLIP FALLOP FILSHIE TI PR STRL BX/20: Type: IMPLANTABLE DEVICE | Site: FALLOPIAN TUBE | Status: FUNCTIONAL

## 2020-12-09 RX ORDER — METHYLERGONOVINE MALEATE 0.2 MG/ML
200 INJECTION INTRAVENOUS ONCE AS NEEDED
Status: DISCONTINUED | OUTPATIENT
Start: 2020-12-09 | End: 2020-12-11 | Stop reason: HOSPADM

## 2020-12-09 RX ORDER — HYDROXYZINE 50 MG/1
50 TABLET, FILM COATED ORAL EVERY 6 HOURS PRN
Status: DISCONTINUED | OUTPATIENT
Start: 2020-12-09 | End: 2020-12-11 | Stop reason: HOSPADM

## 2020-12-09 RX ORDER — ACETAMINOPHEN 325 MG/1
650 TABLET ORAL EVERY 4 HOURS PRN
Status: DISCONTINUED | OUTPATIENT
Start: 2020-12-09 | End: 2020-12-09 | Stop reason: HOSPADM

## 2020-12-09 RX ORDER — KETOROLAC TROMETHAMINE 30 MG/ML
30 INJECTION, SOLUTION INTRAMUSCULAR; INTRAVENOUS EVERY 6 HOURS PRN
Status: DISCONTINUED | OUTPATIENT
Start: 2020-12-09 | End: 2020-12-09

## 2020-12-09 RX ORDER — OXYTOCIN-SODIUM CHLORIDE 0.9% IV SOLN 30 UNIT/500ML 30-0.9/5 UT/ML-%
999 SOLUTION INTRAVENOUS ONCE
Status: DISCONTINUED | OUTPATIENT
Start: 2020-12-09 | End: 2020-12-11 | Stop reason: HOSPADM

## 2020-12-09 RX ORDER — OXYTOCIN-SODIUM CHLORIDE 0.9% IV SOLN 30 UNIT/500ML 30-0.9/5 UT/ML-%
250 SOLUTION INTRAVENOUS CONTINUOUS
Status: ACTIVE | OUTPATIENT
Start: 2020-12-09 | End: 2020-12-09

## 2020-12-09 RX ORDER — LANOLIN 100 %
OINTMENT (GRAM) TOPICAL
Status: DISCONTINUED | OUTPATIENT
Start: 2020-12-09 | End: 2020-12-11 | Stop reason: HOSPADM

## 2020-12-09 RX ORDER — PANTOPRAZOLE SODIUM 40 MG/1
40 TABLET, DELAYED RELEASE ORAL DAILY
Status: CANCELLED | OUTPATIENT
Start: 2020-12-09

## 2020-12-09 RX ORDER — SIMETHICONE 80 MG
80 TABLET,CHEWABLE ORAL 4 TIMES DAILY PRN
Status: DISCONTINUED | OUTPATIENT
Start: 2020-12-09 | End: 2020-12-11 | Stop reason: HOSPADM

## 2020-12-09 RX ORDER — FENTANYL CITRATE 50 UG/ML
50 INJECTION, SOLUTION INTRAMUSCULAR; INTRAVENOUS
Status: DISCONTINUED | OUTPATIENT
Start: 2020-12-09 | End: 2020-12-09

## 2020-12-09 RX ORDER — OXYCODONE HYDROCHLORIDE AND ACETAMINOPHEN 5; 325 MG/1; MG/1
1 TABLET ORAL ONCE AS NEEDED
Status: DISCONTINUED | OUTPATIENT
Start: 2020-12-09 | End: 2020-12-09

## 2020-12-09 RX ORDER — ONDANSETRON 4 MG/1
4 TABLET, FILM COATED ORAL EVERY 6 HOURS PRN
Status: DISCONTINUED | OUTPATIENT
Start: 2020-12-09 | End: 2020-12-09 | Stop reason: HOSPADM

## 2020-12-09 RX ORDER — IBUPROFEN 800 MG/1
800 TABLET ORAL 3 TIMES DAILY
Status: DISCONTINUED | OUTPATIENT
Start: 2020-12-09 | End: 2020-12-11 | Stop reason: HOSPADM

## 2020-12-09 RX ORDER — BUTORPHANOL TARTRATE 1 MG/ML
1 INJECTION, SOLUTION INTRAMUSCULAR; INTRAVENOUS
Status: DISCONTINUED | OUTPATIENT
Start: 2020-12-09 | End: 2020-12-09 | Stop reason: HOSPADM

## 2020-12-09 RX ORDER — OXYTOCIN-SODIUM CHLORIDE 0.9% IV SOLN 30 UNIT/500ML 30-0.9/5 UT/ML-%
125 SOLUTION INTRAVENOUS CONTINUOUS PRN
Status: DISCONTINUED | OUTPATIENT
Start: 2020-12-09 | End: 2020-12-11 | Stop reason: HOSPADM

## 2020-12-09 RX ORDER — SODIUM CHLORIDE 0.9 % (FLUSH) 0.9 %
3-10 SYRINGE (ML) INJECTION AS NEEDED
Status: DISCONTINUED | OUTPATIENT
Start: 2020-12-09 | End: 2020-12-09 | Stop reason: HOSPADM

## 2020-12-09 RX ORDER — SODIUM CHLORIDE 0.9 % (FLUSH) 0.9 %
10 SYRINGE (ML) INJECTION AS NEEDED
Status: DISCONTINUED | OUTPATIENT
Start: 2020-12-09 | End: 2020-12-11 | Stop reason: HOSPADM

## 2020-12-09 RX ORDER — MORPHINE SULFATE 0.5 MG/ML
INJECTION, SOLUTION EPIDURAL; INTRATHECAL; INTRAVENOUS AS NEEDED
Status: DISCONTINUED | OUTPATIENT
Start: 2020-12-09 | End: 2020-12-09 | Stop reason: SURG

## 2020-12-09 RX ORDER — DROPERIDOL 2.5 MG/ML
0.62 INJECTION, SOLUTION INTRAMUSCULAR; INTRAVENOUS ONCE AS NEEDED
Status: DISCONTINUED | OUTPATIENT
Start: 2020-12-09 | End: 2020-12-09

## 2020-12-09 RX ORDER — CARBOPROST TROMETHAMINE 250 UG/ML
250 INJECTION, SOLUTION INTRAMUSCULAR AS NEEDED
Status: DISCONTINUED | OUTPATIENT
Start: 2020-12-09 | End: 2020-12-11 | Stop reason: HOSPADM

## 2020-12-09 RX ORDER — MAGNESIUM HYDROXIDE 1200 MG/15ML
LIQUID ORAL AS NEEDED
Status: DISCONTINUED | OUTPATIENT
Start: 2020-12-09 | End: 2020-12-11 | Stop reason: HOSPADM

## 2020-12-09 RX ORDER — ACETAMINOPHEN 325 MG/1
650 TABLET ORAL EVERY 4 HOURS PRN
Status: DISCONTINUED | OUTPATIENT
Start: 2020-12-09 | End: 2020-12-11 | Stop reason: HOSPADM

## 2020-12-09 RX ORDER — METHYLERGONOVINE MALEATE 0.2 MG/ML
200 INJECTION INTRAVENOUS ONCE AS NEEDED
Status: DISCONTINUED | OUTPATIENT
Start: 2020-12-09 | End: 2020-12-09 | Stop reason: HOSPADM

## 2020-12-09 RX ORDER — SODIUM CHLORIDE, SODIUM LACTATE, POTASSIUM CHLORIDE, CALCIUM CHLORIDE 600; 310; 30; 20 MG/100ML; MG/100ML; MG/100ML; MG/100ML
100 INJECTION, SOLUTION INTRAVENOUS ONCE AS NEEDED
Status: DISCONTINUED | OUTPATIENT
Start: 2020-12-09 | End: 2020-12-09

## 2020-12-09 RX ORDER — OXYTOCIN-SODIUM CHLORIDE 0.9% IV SOLN 30 UNIT/500ML 30-0.9/5 UT/ML-%
2 SOLUTION INTRAVENOUS
Status: DISCONTINUED | OUTPATIENT
Start: 2020-12-09 | End: 2020-12-09 | Stop reason: HOSPADM

## 2020-12-09 RX ORDER — MISOPROSTOL 100 UG/1
TABLET ORAL
Status: COMPLETED
Start: 2020-12-09 | End: 2020-12-09

## 2020-12-09 RX ORDER — MISOPROSTOL 100 UG/1
600 TABLET ORAL AS NEEDED
Status: DISCONTINUED | OUTPATIENT
Start: 2020-12-09 | End: 2020-12-11 | Stop reason: HOSPADM

## 2020-12-09 RX ORDER — CARBOPROST TROMETHAMINE 250 UG/ML
250 INJECTION, SOLUTION INTRAMUSCULAR AS NEEDED
Status: DISCONTINUED | OUTPATIENT
Start: 2020-12-09 | End: 2020-12-09 | Stop reason: HOSPADM

## 2020-12-09 RX ORDER — BUPIVACAINE HYDROCHLORIDE 7.5 MG/ML
INJECTION, SOLUTION EPIDURAL; RETROBULBAR AS NEEDED
Status: DISCONTINUED | OUTPATIENT
Start: 2020-12-09 | End: 2020-12-09 | Stop reason: SURG

## 2020-12-09 RX ORDER — NALOXONE HCL 0.4 MG/ML
0.1 VIAL (ML) INJECTION
Status: DISCONTINUED | OUTPATIENT
Start: 2020-12-09 | End: 2020-12-11 | Stop reason: HOSPADM

## 2020-12-09 RX ORDER — OXYCODONE HYDROCHLORIDE AND ACETAMINOPHEN 5; 325 MG/1; MG/1
1 TABLET ORAL EVERY 6 HOURS PRN
Status: CANCELLED | OUTPATIENT
Start: 2020-12-09

## 2020-12-09 RX ORDER — ONDANSETRON 2 MG/ML
4 INJECTION INTRAMUSCULAR; INTRAVENOUS AS NEEDED
Status: DISCONTINUED | OUTPATIENT
Start: 2020-12-09 | End: 2020-12-09

## 2020-12-09 RX ORDER — IPRATROPIUM BROMIDE AND ALBUTEROL SULFATE 2.5; .5 MG/3ML; MG/3ML
3 SOLUTION RESPIRATORY (INHALATION) ONCE AS NEEDED
Status: DISCONTINUED | OUTPATIENT
Start: 2020-12-09 | End: 2020-12-09

## 2020-12-09 RX ORDER — HYDROCORTISONE 25 MG/G
CREAM TOPICAL 3 TIMES DAILY PRN
Status: DISCONTINUED | OUTPATIENT
Start: 2020-12-09 | End: 2020-12-11 | Stop reason: HOSPADM

## 2020-12-09 RX ORDER — TERBUTALINE SULFATE 1 MG/ML
INJECTION, SOLUTION SUBCUTANEOUS
Status: COMPLETED
Start: 2020-12-09 | End: 2020-12-09

## 2020-12-09 RX ORDER — MIDAZOLAM HYDROCHLORIDE 1 MG/ML
1 INJECTION INTRAMUSCULAR; INTRAVENOUS
Status: DISCONTINUED | OUTPATIENT
Start: 2020-12-09 | End: 2020-12-11 | Stop reason: HOSPADM

## 2020-12-09 RX ORDER — MEPERIDINE HYDROCHLORIDE 25 MG/ML
12.5 INJECTION INTRAMUSCULAR; INTRAVENOUS; SUBCUTANEOUS
Status: DISCONTINUED | OUTPATIENT
Start: 2020-12-09 | End: 2020-12-09

## 2020-12-09 RX ORDER — HYDROMORPHONE HYDROCHLORIDE 1 MG/ML
0.5 INJECTION, SOLUTION INTRAMUSCULAR; INTRAVENOUS; SUBCUTANEOUS
Status: DISCONTINUED | OUTPATIENT
Start: 2020-12-09 | End: 2020-12-11 | Stop reason: HOSPADM

## 2020-12-09 RX ORDER — SODIUM CHLORIDE, SODIUM LACTATE, POTASSIUM CHLORIDE, CALCIUM CHLORIDE 600; 310; 30; 20 MG/100ML; MG/100ML; MG/100ML; MG/100ML
125 INJECTION, SOLUTION INTRAVENOUS CONTINUOUS
Status: DISCONTINUED | OUTPATIENT
Start: 2020-12-09 | End: 2020-12-09

## 2020-12-09 RX ORDER — SODIUM CHLORIDE, SODIUM LACTATE, POTASSIUM CHLORIDE, CALCIUM CHLORIDE 600; 310; 30; 20 MG/100ML; MG/100ML; MG/100ML; MG/100ML
125 INJECTION, SOLUTION INTRAVENOUS ONCE
Status: DISCONTINUED | OUTPATIENT
Start: 2020-12-09 | End: 2020-12-11 | Stop reason: HOSPADM

## 2020-12-09 RX ORDER — ONDANSETRON 2 MG/ML
INJECTION INTRAMUSCULAR; INTRAVENOUS AS NEEDED
Status: DISCONTINUED | OUTPATIENT
Start: 2020-12-09 | End: 2020-12-09 | Stop reason: SURG

## 2020-12-09 RX ORDER — CEFAZOLIN SODIUM 2 G/50ML
2 SOLUTION INTRAVENOUS ONCE
Status: COMPLETED | OUTPATIENT
Start: 2020-12-09 | End: 2020-12-09

## 2020-12-09 RX ORDER — ONDANSETRON 2 MG/ML
4 INJECTION INTRAMUSCULAR; INTRAVENOUS EVERY 6 HOURS PRN
Status: DISCONTINUED | OUTPATIENT
Start: 2020-12-09 | End: 2020-12-09 | Stop reason: HOSPADM

## 2020-12-09 RX ORDER — MISOPROSTOL 100 UG/1
600 TABLET ORAL AS NEEDED
Status: DISCONTINUED | OUTPATIENT
Start: 2020-12-09 | End: 2020-12-09 | Stop reason: HOSPADM

## 2020-12-09 RX ORDER — KETOROLAC TROMETHAMINE 30 MG/ML
30 INJECTION, SOLUTION INTRAMUSCULAR; INTRAVENOUS EVERY 6 HOURS PRN
Status: DISPENSED | OUTPATIENT
Start: 2020-12-09 | End: 2020-12-10

## 2020-12-09 RX ORDER — OXYCODONE HYDROCHLORIDE AND ACETAMINOPHEN 5; 325 MG/1; MG/1
1 TABLET ORAL EVERY 4 HOURS PRN
Status: DISCONTINUED | OUTPATIENT
Start: 2020-12-09 | End: 2020-12-11 | Stop reason: HOSPADM

## 2020-12-09 RX ORDER — SODIUM CHLORIDE, SODIUM LACTATE, POTASSIUM CHLORIDE, CALCIUM CHLORIDE 600; 310; 30; 20 MG/100ML; MG/100ML; MG/100ML; MG/100ML
125 INJECTION, SOLUTION INTRAVENOUS CONTINUOUS
Status: DISCONTINUED | OUTPATIENT
Start: 2020-12-09 | End: 2020-12-11 | Stop reason: HOSPADM

## 2020-12-09 RX ORDER — FENTANYL CITRATE 50 UG/ML
INJECTION, SOLUTION INTRAMUSCULAR; INTRAVENOUS AS NEEDED
Status: DISCONTINUED | OUTPATIENT
Start: 2020-12-09 | End: 2020-12-09 | Stop reason: SURG

## 2020-12-09 RX ORDER — IBUPROFEN 800 MG/1
800 TABLET ORAL EVERY 6 HOURS PRN
Status: DISCONTINUED | OUTPATIENT
Start: 2020-12-09 | End: 2020-12-11 | Stop reason: HOSPADM

## 2020-12-09 RX ORDER — SODIUM CHLORIDE 0.9 % (FLUSH) 0.9 %
10 SYRINGE (ML) INJECTION EVERY 12 HOURS SCHEDULED
Status: DISCONTINUED | OUTPATIENT
Start: 2020-12-09 | End: 2020-12-11 | Stop reason: HOSPADM

## 2020-12-09 RX ORDER — TERBUTALINE SULFATE 1 MG/ML
0.25 INJECTION, SOLUTION SUBCUTANEOUS ONCE
Status: DISCONTINUED | OUTPATIENT
Start: 2020-12-09 | End: 2020-12-11 | Stop reason: HOSPADM

## 2020-12-09 RX ORDER — ONDANSETRON 4 MG/1
4 TABLET, FILM COATED ORAL EVERY 8 HOURS PRN
Status: DISCONTINUED | OUTPATIENT
Start: 2020-12-09 | End: 2020-12-11 | Stop reason: HOSPADM

## 2020-12-09 RX ADMIN — IBUPROFEN 800 MG: 800 TABLET, FILM COATED ORAL at 23:55

## 2020-12-09 RX ADMIN — SODIUM CHLORIDE, POTASSIUM CHLORIDE, SODIUM LACTATE AND CALCIUM CHLORIDE: 600; 310; 30; 20 INJECTION, SOLUTION INTRAVENOUS at 12:42

## 2020-12-09 RX ADMIN — MISOPROSTOL 600 MCG: 100 TABLET ORAL at 13:36

## 2020-12-09 RX ADMIN — SODIUM CHLORIDE, POTASSIUM CHLORIDE, SODIUM LACTATE AND CALCIUM CHLORIDE 125 ML/HR: 600; 310; 30; 20 INJECTION, SOLUTION INTRAVENOUS at 08:49

## 2020-12-09 RX ADMIN — MORPHINE SULFATE 2 MG: 0.5 INJECTION EPIDURAL; INTRATHECAL; INTRAVENOUS at 13:14

## 2020-12-09 RX ADMIN — ONDANSETRON 4 MG: 2 INJECTION INTRAMUSCULAR; INTRAVENOUS at 13:11

## 2020-12-09 RX ADMIN — OXYTOCIN 2 MILLI-UNITS/MIN: 10 INJECTION INTRAVENOUS at 09:05

## 2020-12-09 RX ADMIN — OXYCODONE HYDROCHLORIDE AND ACETAMINOPHEN 1 TABLET: 5; 325 TABLET ORAL at 23:56

## 2020-12-09 RX ADMIN — MORPHINE SULFATE 0.3 MG: 0.5 INJECTION EPIDURAL; INTRATHECAL; INTRAVENOUS at 12:52

## 2020-12-09 RX ADMIN — BUPIVACAINE HYDROCHLORIDE 1.4 ML: 7.5 INJECTION, SOLUTION EPIDURAL; RETROBULBAR at 12:52

## 2020-12-09 RX ADMIN — FENTANYL CITRATE 25 MCG: 50 INJECTION, SOLUTION INTRAMUSCULAR; INTRAVENOUS at 12:52

## 2020-12-09 RX ADMIN — HYDROMORPHONE HYDROCHLORIDE 0.5 MG: 1 INJECTION, SOLUTION INTRAMUSCULAR; INTRAVENOUS; SUBCUTANEOUS at 21:02

## 2020-12-09 RX ADMIN — FENTANYL CITRATE 75 MCG: 50 INJECTION, SOLUTION INTRAMUSCULAR; INTRAVENOUS at 13:11

## 2020-12-09 RX ADMIN — KETOROLAC TROMETHAMINE 30 MG: 30 INJECTION, SOLUTION INTRAMUSCULAR; INTRAVENOUS at 16:17

## 2020-12-09 RX ADMIN — CEFAZOLIN SODIUM 2 G: 2 SOLUTION INTRAVENOUS at 12:42

## 2020-12-09 RX ADMIN — TERBUTALINE SULFATE 0.25 MG: 1 INJECTION, SOLUTION SUBCUTANEOUS at 11:52

## 2020-12-09 RX ADMIN — OXYTOCIN 250 ML/HR: 10 INJECTION, SOLUTION INTRAMUSCULAR; INTRAVENOUS at 14:53

## 2020-12-09 RX ADMIN — OXYTOCIN 250 ML/HR: 10 INJECTION, SOLUTION INTRAMUSCULAR; INTRAVENOUS at 17:22

## 2020-12-09 RX ADMIN — SODIUM CHLORIDE, POTASSIUM CHLORIDE, SODIUM LACTATE AND CALCIUM CHLORIDE 125 ML/HR: 600; 310; 30; 20 INJECTION, SOLUTION INTRAVENOUS at 20:59

## 2020-12-09 RX ADMIN — MORPHINE SULFATE 2.7 MG: 0.5 INJECTION EPIDURAL; INTRATHECAL; INTRAVENOUS at 13:12

## 2020-12-09 NOTE — L&D DELIVERY NOTE
Primary Low Transverse  Section with Bilateral Tubal Ligation Operation Note    Kenia Pollack  2020  Gestational Age: 36 weeks    Pre-op Diagnosis:   #1 compound presentation  #2 PPROM  #3 repetitive fetal decelerations    Post-op Diagnosis:     Same as above    Procedure(s):   SECTION PRIMARY WITH TUBAL     Surgeon(s):  Johanny Murrieta DO    Anesthesia: Spinal    Staff:   Circulator: Dayami Karimi RN  Baby Nurse: Mohini Benson RN  Assistant: Zuleyka Zaidi  OB TECH: Gladis Gunn RN    Estimated Blood Loss: 750 cc    Urine Output:: 400 mL    IVF: Intravenous fluids were administered, lactated Ringer's      Grafts/Implants: NA    Procedure     The patient was placed in the dorsal lithotomy position.  She was then prepped and draped in the normal sterile fashion.  Spinal anesthesia was found to be adequate.  A pfannenstiel skin incision was made with the scalpel and carried down to the underlying layer of fascia.  The fascia was then incised and extended bilaterally with the das scissors.  The superior aspect of this incision was grasped with kocher clamps x 2, tented upward and dissected off with das scissors.  The clamps were removed and placed on the inferior aspect of the incision, tented upward and dissected off the rectus muscle to the pubic simphysis.  The rectus muscle was then  in the midline.  The peritoneum was identified, grasped with hemostats x 2 and entered sharply with Metzenbaum scissors.  This incision was then extended in a superiorly and inferiorly.  The bladder blade was then inserted.  The vesicouterine peritoneum was grasped and entered sharply with Metzenbaum scissors and extended bilaterally.  The bladder blade was re-inserted.  A transverse uterine incision was then made with the scalpel and extended in a cranio-caudal  Blunt fashion.  Bladder blade was removed. The infants hand was felt and moved.  The head  was then grasped and brought  through the hysterotomy.  The mouth and nares was bulb suctioned.  The shoulders and body were then delivered atraumatically.  The cord was then doubly clamped and cut and the infant was handed off to the nursing staff with vigorous cry and movement of all extremities.  Cord blood was obtained.  The placenta was manually extracted.  The uterus was exteriorized and cleared of all clot and debris.  The hysterotomy was grasped with Allis clamps at the edge and repaired with 1-0 Monocryl in a single layer running and locked fashion.  The posterior cul-de-sac was irrigated and suctioned.  Attention was then turned to the tubal portion of this procedure.  The right tube was grasped and 2 Filshie clips were applied.  Completely traversing the tube.  The same procedure was performed on the contralateral side. The uterine incision  was hemostatic and the uterus was returned to the abdomen.  The gutters were cleared of all clot and debris.  Second look at the uterine incision was hemostatic.  The rectus muscle was visualized and hemostatic.  Fascia was then grasped with kocher clamps and re-approximated with 0 vicryl in a single layer and running fashion.  The subcutaneous layer was irrigated and hemostatic with Bovie cautery.  This layer was then re-approximated with 2-0 plain gut stitch.  The skin was re-approximated with 3-0 vicryl on a christian needle.  Sterile dressing was applied to the incision.  Uterus was found to be firm and at the umbilicus.  Sponge, lap and needle counts were correct.  Pt taken to recovery in stable condition.     APGARS  8   9            GENDER  Male    FINDINGS: Normal uterus, tubes, and ovaries.  Compound presentation of fetal hand appreciated.  Minimal intraamniotic fluid noted at delivery.    Complications: None    Johanny Murrieta DO     Date: 12/9/2020  Time: 13:45 EST

## 2020-12-09 NOTE — ANESTHESIA PREPROCEDURE EVALUATION
Anesthesia Evaluation     no history of anesthetic complications:  NPO Solid Status: > 8 hours  NPO Liquid Status: > 8 hours           Airway   Mallampati: II  TM distance: >3 FB  Neck ROM: full  No difficulty expected  Dental - normal exam     Pulmonary - normal exam   Cardiovascular - normal exam    (+) hypertension,       Neuro/Psych  GI/Hepatic/Renal/Endo    (+)   renal disease stones,     Musculoskeletal     Abdominal  - normal exam    Bowel sounds: normal.   Substance History      OB/GYN    (+) Pregnant,         Other                        Anesthesia Plan    ASA 2     spinal       Anesthetic plan, all risks, benefits, and alternatives have been provided, discussed and informed consent has been obtained with: patient.

## 2020-12-09 NOTE — ANESTHESIA PROCEDURE NOTES
Spinal Block      Patient location during procedure: OB  Start Time: 12/9/2020 12:48 PM  Stop Time: 12/9/2020 12:53 PM  Indication:at surgeon's request, post-op pain management and procedure for pain  Performed By  Anesthesiologist: Carson Estevez MD  CRNA: Cheri Guerrero CRNA  Preanesthetic Checklist  Completed: patient identified, site marked, surgical consent, pre-op evaluation, timeout performed, IV checked, risks and benefits discussed and monitors and equipment checked  Spinal Block Prep:  Patient Position:sitting  Sterile Tech:cap, gloves and sterile barriers  Prep:Betadine  Patient Monitoring:EKG, continuous pulse oximetry and blood pressure monitoring  Spinal Block Procedure  Approach:midline  Guidance:landmark technique and palpation technique  Location:L4-L5  Needle Type:Pencan  Needle Gauge:25 G  Placement of Spinal needle event:cerebrospinal fluid aspirated  Paresthesia: no  Fluid Appearance:clear     Post Assessment  Patient Tolerance:patient tolerated the procedure well with no apparent complications  Complications no

## 2020-12-09 NOTE — PLAN OF CARE
Goal Outcome Evaluation:           Problem: Bleeding (Labor)  Goal: Hemostasis  Outcome: Unable to Meet, Plan Revised     Problem: Change in Fetal Wellbeing (Labor)  Goal: Stable Fetal Wellbeing  Outcome: Unable to Meet, Plan Revised     Problem: Delayed Labor Progression (Labor)  Goal: Effective Progression to Delivery  Outcome: Unable to Meet, Plan Revised     Problem: Infection (Labor)  Goal: Absence of Infection Signs and Symptoms  Outcome: Unable to Meet, Plan Revised

## 2020-12-09 NOTE — PROGRESS NOTES
Discharge Planning Assessment   [facility]     Patient Name: Kenia Pollack  MRN: [id number]  Today's Date: 12/9/2020    Admit Date: 12/9/2020    SS to complete consult on 12/10/20.     STEPHANE Thrasher

## 2020-12-09 NOTE — PROGRESS NOTES
CTSP for repetitive deep variable decelerations.  Pitocin stopped and Brethine given.  SVE notes 3cm and compound presentation with fetal hand presenting.  I attempted to move hand but without resolution.  Pt counseled to proceed with  section.  Risk and benefit are reviewed.  She admits to understanding and agrees to proceed.

## 2020-12-09 NOTE — PLAN OF CARE
Goal Outcome Evaluation:  Plan of Care Reviewed With: patient, spouse  Progress: improving  Outcome Summary: IV INFUSING AO WITH NO REDNESS OR EDEMA NOTED TO SITE.  YELLOW URINE TO BSD.  PT HAS NO C/O'S

## 2020-12-09 NOTE — ANESTHESIA POSTPROCEDURE EVALUATION
Patient: Kenia Pollack    Procedure Summary     Date: 20 Room / Location:  [facility] 1 /  [facility]    Anesthesia Start: 1242 Anesthesia Stop: 1343    Procedure:  SECTION PRIMARY WITH TUBAL (Bilateral Abdomen) Diagnosis:     Surgeon: Johanny Murrieta DO Provider: Carson Estevez MD    Anesthesia Type: spinal ASA Status: 2          Anesthesia Type: spinal    Vitals  Vitals Value Taken Time   /69 20 1352   Temp     Pulse 76 20 1356   Resp     SpO2 97 % 20 1356   Vitals shown include unvalidated device data.        Post Anesthesia Care and Evaluation    Patient location during evaluation: PHASE II  Patient participation: complete - patient participated  Level of consciousness: awake and alert  Pain score: 0  Pain management: adequate  Airway patency: patent  Anesthetic complications: No anesthetic complications    Cardiovascular status: acceptable  Respiratory status: acceptable  Hydration status: acceptable

## 2020-12-09 NOTE — H&P
MIGDALIA Vega  Obstetric History and Physical    Chief Complaint   Patient presents with   • Leaking Fluid     REPORTS POSSIBLE LEAKING SINCE 0230. DENIES VAG BLEEDING   • Decreased Fetal Movement       Subjective     Patient is a 25 y.o. female  currently at 36w0d, who presents with tiny stretcher of membranes beginning at 230 this morning.  Patient is grossly ruptured.  GBS status is unknown.    Her prenatal care is complicated by  desires sterlization  valid consents currently available.  Her previous obstetric/gynecological history is noted for is non-contributory.    The following portions of the patients history were reviewed and updated as appropriate: current medications, allergies, past medical history, past surgical history, past family history, past social history and problem list .       Prenatal Information:   Maternal Prenatal Labs  Blood Type No results found for: ABO   Rh Status No results found for: RH   Antibody Screen No results found for: ABSCRN   Rapid Urine Drug Screen No results found for: AMPMETHU, BARBITSCNUR, LABBENZSCN, LABMETHSCN, LABOPIASCN, THCURSCR, COCAINEUR, AMPHETSCREEN, PROPOXSCN, BUPRENORSCNU, METAMPSCNUR, OXYCODONESCN, TRICYCLICSCN   Group B Strep Culture No results found for: GBSANTIGEN           External Prenatal Results     Pregnancy Outside Results - Transcribed From Office Records - See Scanned Records For Details     Test Value Date Time    Hgb 9.6 g/dL 20 0812      10.2 g/dL 20 0133      11.3 g/dL 20 0912      14.2 g/dL 20 1144    Hct 30.8 % 20 0812      33.5 % 20 0133      37.1 % 20 0912      43.9 % 20 1144    ABO A  20     Rh Positive  20     Antibody Screen Negative  20     Glucose Fasting GTT 78 mg/dL 10/07/20     Glucose Tolerance Test 1 hour 153  10/07/20     Glucose Tolerance Test 3 hour 148  10/16/20     Gonorrhea (discrete) Negative  20     Chlamydia (discrete) Negative  20     RPR        VDRL       Syphilis Antibody       Rubella Non-Immune  20     HBsAg Negative  20     Herpes Simplex Virus PCR       Herpes Simplex VIrus Culture       HIV Non-Reactive  20     Hep C RNA Quant PCR       Hep C Antibody       AFP       Group B Strep       GBS Susceptibility to Clindamycin       GBS Susceptibility to Erythromycin       Fetal Fibronectin       Genetic Testing, Maternal Blood             Drug Screening     Test Value Date Time    Urine Drug Screen       Amphetamine Screen Negative  20 0115    Barbiturate Screen Negative  20 0115    Benzodiazepine Screen Negative  20 0115    Methadone Screen Negative  205    Phencyclidine Screen Negative  20 011    Opiates Screen Negative  20    THC Screen Negative  20    Cocaine Screen       Propoxyphene Screen Negative  10/05/15 193    Buprenorphine Screen Negative  20 011    Methamphetamine Screen       Oxycodone Screen Negative  20 011    Tricyclic Antidepressants Screen                     Past OB History:     OB History    Para Term  AB Living   2 1 1 0 0 1   SAB TAB Ectopic Molar Multiple Live Births   0 0 0 0 0 1      # Outcome Date GA Lbr Matthew/2nd Weight Sex Delivery Anes PTL Lv   2 Current            1 Term 08/24/15 38w0d  3870 g (8 lb 8.5 oz) F Vaginal Only  N CARLITO       Past Medical History: Past Medical History:   Diagnosis Date   • Hypertension     At age 18   • Kidney stone    • Urinary tract infection       Past Surgical History Past Surgical History:   Procedure Laterality Date   • KIDNEY STONE SURGERY      Kidney stone removal   • LAPAROSCOPIC CHOLECYSTECTOMY     • TONSILLECTOMY        Family History: Family History   Problem Relation Age of Onset   • Ovarian cancer Paternal Grandmother    • Hypertension Maternal Grandmother    • Breast cancer Paternal Aunt       Social History:  reports that she has quit smoking. She has never used smokeless  tobacco.   reports no history of alcohol use.   reports no history of drug use.        Review of Systems      Objective     Vital Signs Range for the last 24 hours  Temperature: Temp:  [97.9 °F (36.6 °C)-98 °F (36.7 °C)] 97.9 °F (36.6 °C)   Temp Source: Temp src: Oral   BP: BP: (132)/(79) 132/79   Pulse: Heart Rate:  [104] 104   Respirations: Resp:  [18] 18   Weight: Weight:  [99 kg (218 lb 4.8 oz)] 99 kg (218 lb 4.8 oz)     Physical Examination: General appearance - alert, well appearing, and in no distress  Abdomen - soft, nontender, nondistended, no masses or organomegaly  Neurological - alert, oriented, normal speech, no focal findings or movement disorder noted    Presentation: Cephalic   Cervix: Exam by:  Dr Murrieta   Dilation:  1-2   Effacement:  40-50   Station:  -4         Assessment/Plan       Amniotic fluid leaking      Assessment & Plan    Assessment/Plan:  1.  Intrauterine pregnancy at 36w0d weeks gestation with reactive fetal status.    2.  PPROM-we will start on GBS prophylaxis and Pitocin augmentation.      Johanny Murrieta DO  12/9/2020  08:35 EST

## 2020-12-10 LAB
BASOPHILS # BLD AUTO: 0.03 10*3/MM3 (ref 0–0.2)
BASOPHILS NFR BLD AUTO: 0.3 % (ref 0–1.5)
DEPRECATED RDW RBC AUTO: 44.5 FL (ref 37–54)
EOSINOPHIL # BLD AUTO: 0.08 10*3/MM3 (ref 0–0.4)
EOSINOPHIL NFR BLD AUTO: 0.7 % (ref 0.3–6.2)
ERYTHROCYTE [DISTWIDTH] IN BLOOD BY AUTOMATED COUNT: 14.6 % (ref 12.3–15.4)
HCT VFR BLD AUTO: 25.5 % (ref 34–46.6)
HGB BLD-MCNC: 7.6 G/DL (ref 12–15.9)
IMM GRANULOCYTES # BLD AUTO: 0.06 10*3/MM3 (ref 0–0.05)
IMM GRANULOCYTES NFR BLD AUTO: 0.5 % (ref 0–0.5)
LYMPHOCYTES # BLD AUTO: 2 10*3/MM3 (ref 0.7–3.1)
LYMPHOCYTES NFR BLD AUTO: 17.8 % (ref 19.6–45.3)
MCH RBC QN AUTO: 25.2 PG (ref 26.6–33)
MCHC RBC AUTO-ENTMCNC: 29.8 G/DL (ref 31.5–35.7)
MCV RBC AUTO: 84.4 FL (ref 79–97)
MONOCYTES # BLD AUTO: 0.9 10*3/MM3 (ref 0.1–0.9)
MONOCYTES NFR BLD AUTO: 8 % (ref 5–12)
NEUTROPHILS NFR BLD AUTO: 72.7 % (ref 42.7–76)
NEUTROPHILS NFR BLD AUTO: 8.14 10*3/MM3 (ref 1.7–7)
NRBC BLD AUTO-RTO: 0 /100 WBC (ref 0–0.2)
PLATELET # BLD AUTO: 217 10*3/MM3 (ref 140–450)
PMV BLD AUTO: 10.4 FL (ref 6–12)
RBC # BLD AUTO: 3.02 10*6/MM3 (ref 3.77–5.28)
WBC # BLD AUTO: 11.21 10*3/MM3 (ref 3.4–10.8)

## 2020-12-10 PROCEDURE — 85025 COMPLETE CBC W/AUTO DIFF WBC: CPT | Performed by: OBSTETRICS & GYNECOLOGY

## 2020-12-10 RX ORDER — DOCUSATE SODIUM 100 MG/1
100 CAPSULE, LIQUID FILLED ORAL 2 TIMES DAILY
Status: DISCONTINUED | OUTPATIENT
Start: 2020-12-10 | End: 2020-12-11 | Stop reason: HOSPADM

## 2020-12-10 RX ORDER — BISACODYL 10 MG
10 SUPPOSITORY, RECTAL RECTAL DAILY PRN
Status: DISCONTINUED | OUTPATIENT
Start: 2020-12-10 | End: 2020-12-11 | Stop reason: HOSPADM

## 2020-12-10 RX ADMIN — DOCUSATE SODIUM 100 MG: 100 CAPSULE ORAL at 22:26

## 2020-12-10 RX ADMIN — OXYCODONE HYDROCHLORIDE AND ACETAMINOPHEN 1 TABLET: 5; 325 TABLET ORAL at 16:40

## 2020-12-10 RX ADMIN — IBUPROFEN 800 MG: 800 TABLET, FILM COATED ORAL at 15:07

## 2020-12-10 RX ADMIN — OXYCODONE HYDROCHLORIDE AND ACETAMINOPHEN 1 TABLET: 5; 325 TABLET ORAL at 04:19

## 2020-12-10 RX ADMIN — SIMETHICONE 80 MG: 80 TABLET, CHEWABLE ORAL at 21:01

## 2020-12-10 RX ADMIN — IBUPROFEN 800 MG: 800 TABLET, FILM COATED ORAL at 21:01

## 2020-12-10 RX ADMIN — OXYCODONE HYDROCHLORIDE AND ACETAMINOPHEN 1 TABLET: 5; 325 TABLET ORAL at 12:21

## 2020-12-10 RX ADMIN — IBUPROFEN 800 MG: 800 TABLET, FILM COATED ORAL at 09:05

## 2020-12-10 RX ADMIN — BISACODYL 10 MG: 10 SUPPOSITORY RECTAL at 22:25

## 2020-12-10 RX ADMIN — HYDROXYZINE HYDROCHLORIDE 50 MG: 50 TABLET ORAL at 04:32

## 2020-12-10 RX ADMIN — OXYCODONE HYDROCHLORIDE AND ACETAMINOPHEN 1 TABLET: 5; 325 TABLET ORAL at 09:05

## 2020-12-10 RX ADMIN — SIMETHICONE 80 MG: 80 TABLET, CHEWABLE ORAL at 04:19

## 2020-12-10 RX ADMIN — OXYCODONE HYDROCHLORIDE AND ACETAMINOPHEN 1 TABLET: 5; 325 TABLET ORAL at 21:01

## 2020-12-10 NOTE — PROGRESS NOTES
"Discharge Planning Assessment  [facility]     Patient Name: Kenia Pollack  MRN: [id number]  Today's Date: 12/10/2020    Admit Date: 12/9/2020    SS received a consult for, \"H/O THC use\". SS spoke with Joana at [facility] who states that pt was +THC on 6/1/20, and 7/3/20. She was negative on 10/7/20 and 11/13/20. She and baby were also negative upon admission.     SS spoke with pt on this day. Pt has delivered a viable baby boy, Je Mcdonald. Pt lives at home with FOB, Everardo Mcdonald, and their daughter, Sally Mcdonald (age 5). Their address is [address] [place] [address]. Pt states that she has custody of her daughter, Sally, and that she has no history with DCBS.     Pt plans to sign up for WIC, and has the car seat and other infant supplies needed.     SS was also consulted due to a score of 11 on post partum depression screen. SS asked pt is she felt that any mental health services were needed. She said not at this time. SS educated pt about making her PCP aware if she continues to experience negative feelings, or this worsens. She verbalized understanding.     SS made a report to central intake. Intake ID is #[id number]. SS will check back to see if report is accepted.     15:52 Report was not accepted for investigation. Infant to be discharged home with mother.     STEPHANE Thrasher    "

## 2020-12-10 NOTE — PAYOR COMM NOTE
"  [facility]  NPI: [id number]    Utilization Review   Contact:Araceli Chavez MSN, APRN, NP-C  Phone: [phone]  Fax: [phone]    tiffani /Attn: nolan Henao Auth Req/delivery  REF:[id number]  Z37.0, O34.219  Kenia Pollack (25 y.o. Female)     Date of Birth Social Security Number Address Home Phone MRN    1995  [address] [facility] [address] [phone] [id number]    L[employer]-day Marital Status          Zoroastrian        Admission Date Admission Type Admitting Provider Attending Provider Department, Room/Bed    20 Urgent Johanny Murrieta, Johanny Cha DO [facility], [facility]    Discharge Date Discharge Disposition Discharge Destination                       Attending Provider: Johanny Murrieta DO    Allergies: No Known Allergies    Isolation: None   Infection: None   Code Status: CPR    Ht: 170.2 cm (67\")   Wt: 99 kg (218 lb 4.8 oz)    Admission Cmt: None   Principal Problem: None                Active Insurance as of 2020     Primary Coverage     Payor Plan Insurance Group Employer/Plan Group    [facility] [place]      Payor Plan Address Payor Plan Phone Number Payor Plan Fax Number Effective Dates    [address] [address]   2016 - None Entered    [place] [address]       Subscriber Name Subscriber Birth Date Member ID       KENIA POLLACK 1995 [id number]                 Emergency Contacts      (Rel.) Home Phone Work Phone Mobile Phone    Anny Pollack (Mother) [phone] -- --    Everardo Mcdonald (Spouse) [phone] -- --               History & Physical      Johanny Murrieta DO at 20 0830          [facility]  Obstetric History and Physical    Chief Complaint   Patient presents with   • Leaking Fluid     REPORTS POSSIBLE LEAKING SINCE 0230. DENIES VAG BLEEDING   • Decreased Fetal Movement       Subjective     Patient is a 25 y.o. female  currently at 36w0d, who presents " with tiny stretcher of membranes beginning at 230 this morning.  Patient is grossly ruptured.  GBS status is unknown.    Her prenatal care is complicated by  desires sterlization  valid consents currently available.  Her previous obstetric/gynecological history is noted for is non-contributory.    The following portions of the patients history were reviewed and updated as appropriate: current medications, allergies, past medical history, past surgical history, past family history, past social history and problem list .       Prenatal Information:   Maternal Prenatal Labs  Blood Type No results found for: ABO   Rh Status No results found for: RH   Antibody Screen No results found for: ABSCRN   Rapid Urine Drug Screen No results found for: AMPMETHU, BARBITSCNUR, LABBENZSCN, LABMETHSCN, LABOPIASCN, THCURSCR, COCAINEUR, AMPHETSCREEN, PROPOXSCN, BUPRENORSCNU, METAMPSCNUR, OXYCODONESCN, TRICYCLICSCN   Group B Strep Culture No results found for: GBSANTIGEN           External Prenatal Results     Pregnancy Outside Results - Transcribed From Office Records - See Scanned Records For Details     Test Value Date Time    Hgb 9.6 g/dL 11/26/20 0812      10.2 g/dL 11/25/20 0133      11.3 g/dL 11/19/20 0912      14.2 g/dL 06/04/20 1144    Hct 30.8 % 11/26/20 0812      33.5 % 11/25/20 0133      37.1 % 11/19/20 0912      43.9 % 06/04/20 1144    ABO A  06/16/20     Rh Positive  06/16/20     Antibody Screen Negative  06/16/20     Glucose Fasting GTT 78 mg/dL 10/07/20     Glucose Tolerance Test 1 hour 153  10/07/20     Glucose Tolerance Test 3 hour 148  10/16/20     Gonorrhea (discrete) Negative  06/16/20     Chlamydia (discrete) Negative  06/16/20     RPR       VDRL       Syphilis Antibody       Rubella Non-Immune  06/16/20     HBsAg Negative  06/16/20     Herpes Simplex Virus PCR       Herpes Simplex VIrus Culture       HIV Non-Reactive  06/16/20     Hep C RNA Quant PCR       Hep C Antibody       AFP       Group B Strep       GBS  Susceptibility to Clindamycin       GBS Susceptibility to Erythromycin       Fetal Fibronectin       Genetic Testing, Maternal Blood             Drug Screening     Test Value Date Time    Urine Drug Screen       Amphetamine Screen Negative  20    Barbiturate Screen Negative  20    Benzodiazepine Screen Negative  20    Methadone Screen Negative  20    Phencyclidine Screen Negative  20    Opiates Screen Negative  20    THC Screen Negative  20    Cocaine Screen       Propoxyphene Screen Negative  10/05/15 193    Buprenorphine Screen Negative  20    Methamphetamine Screen       Oxycodone Screen Negative  20    Tricyclic Antidepressants Screen                     Past OB History:     OB History    Para Term  AB Living   2 1 1 0 0 1   SAB TAB Ectopic Molar Multiple Live Births   0 0 0 0 0 1      # Outcome Date GA Lbr Matthew/2nd Weight Sex Delivery Anes PTL Lv   2 Current            1 Term 08/24/15 38w0d  3870 g (8 lb 8.5 oz) F Vaginal Only  N CARLITO       Past Medical History: Past Medical History:   Diagnosis Date   • Hypertension     At age 18   • Kidney stone    • Urinary tract infection       Past Surgical History Past Surgical History:   Procedure Laterality Date   • KIDNEY STONE SURGERY      Kidney stone removal   • LAPAROSCOPIC CHOLECYSTECTOMY     • TONSILLECTOMY        Family History: Family History   Problem Relation Age of Onset   • Ovarian cancer Paternal Grandmother    • Hypertension Maternal Grandmother    • Breast cancer Paternal Aunt       Social History:  reports that she has quit smoking. She has never used smokeless tobacco.   reports no history of alcohol use.   reports no history of drug use.        Review of Systems      Objective     Vital Signs Range for the last 24 hours  Temperature: Temp:  [97.9 °F (36.6 °C)-98 °F (36.7 °C)] 97.9 °F (36.6 °C)   Temp Source: Temp src: Oral   BP: BP:  (132)/(79) 132/79   Pulse: Heart Rate:  [104] 104   Respirations: Resp:  [18] 18   Weight: Weight:  [99 kg (218 lb 4.8 oz)] 99 kg (218 lb 4.8 oz)     Physical Examination: General appearance - alert, well appearing, and in no distress  Abdomen - soft, nontender, nondistended, no masses or organomegaly  Neurological - alert, oriented, normal speech, no focal findings or movement disorder noted    Presentation: Cephalic   Cervix: Exam by:  Dr Murrieta   Dilation:  1-2   Effacement:  40-50   Station:  -4         Assessment/Plan       Amniotic fluid leaking      Assessment & Plan    Assessment/Plan:  1.  Intrauterine pregnancy at 36w0d weeks gestation with reactive fetal status.    2.  PPROM-we will start on GBS prophylaxis and Pitocin augmentation.      Johanny Murrieta DO  2020  08:35 EST    Electronically signed by Johanny Murrieta DO at 20 0837     H&P signed by [employer], [place] at 20 0842      Scan on 2020 by [employer], [place]: H&amp;P, [facility], 2020          Electronically signed by [employer] On[employer], [place] at 20 0842          Operative/Procedure Notes (last 24 hours) (Notes from 20 0748 through 12/10/20 0748)      Johanny Murrieta DO at 20 1345          Primary Low Transverse  Section with Bilateral Tubal Ligation Operation Note    Kenia Pollack  2020  Gestational Age: 36 weeks    Pre-op Diagnosis:   #1 compound presentation  #2 PPROM  #3 repetitive fetal decelerations    Post-op Diagnosis:     Same as above    Procedure(s):   SECTION PRIMARY WITH TUBAL     Surgeon(s):  Johanny Murrieta DO    Anesthesia: Spinal    Staff:   Circulator: Dayami Karimi, RN  Baby Nurse: Mohini Benson RN  Assistant: Zuleyka Zaidi  OB TECH: Gladis Gunn RN    Estimated Blood Loss: 750 cc    Urine Output:: 400 mL    IVF: Intravenous fluids were administered, lactated Ringer's      Grafts/Implants: NA    Procedure     The patient was placed in  the dorsal lithotomy position.  She was then prepped and draped in the normal sterile fashion.  Spinal anesthesia was found to be adequate.  A pfannenstiel skin incision was made with the scalpel and carried down to the underlying layer of fascia.  The fascia was then incised and extended bilaterally with the das scissors.  The superior aspect of this incision was grasped with kocher clamps x 2, tented upward and dissected off with das scissors.  The clamps were removed and placed on the inferior aspect of the incision, tented upward and dissected off the rectus muscle to the pubic simphysis.  The rectus muscle was then  in the midline.  The peritoneum was identified, grasped with hemostats x 2 and entered sharply with Metzenbaum scissors.  This incision was then extended in a superiorly and inferiorly.  The bladder blade was then inserted.  The vesicouterine peritoneum was grasped and entered sharply with Metzenbaum scissors and extended bilaterally.  The bladder blade was re-inserted.  A transverse uterine incision was then made with the scalpel and extended in a cranio-caudal  Blunt fashion.  Bladder blade was removed. The infants hand was felt and moved.  The head  was then grasped and brought through the hysterotomy.  The mouth and nares was bulb suctioned.  The shoulders and body were then delivered atraumatically.  The cord was then doubly clamped and cut and the infant was handed off to the nursing staff with vigorous cry and movement of all extremities.  Cord blood was obtained.  The placenta was manually extracted.  The uterus was exteriorized and cleared of all clot and debris.  The hysterotomy was grasped with Allis clamps at the edge and repaired with 1-0 Monocryl in a single layer running and locked fashion.  The posterior cul-de-sac was irrigated and suctioned.  Attention was then turned to the tubal portion of this procedure.  The right tube was grasped and 2 Filshie clips were applied.   Completely traversing the tube.  The same procedure was performed on the contralateral side. The uterine incision  was hemostatic and the uterus was returned to the abdomen.  The gutters were cleared of all clot and debris.  Second look at the uterine incision was hemostatic.  The rectus muscle was visualized and hemostatic.  Fascia was then grasped with kocher clamps and re-approximated with 0 vicryl in a single layer and running fashion.  The subcutaneous layer was irrigated and hemostatic with Bovie cautery.  This layer was then re-approximated with 2-0 plain gut stitch.  The skin was re-approximated with 3-0 vicryl on a christian needle.  Sterile dressing was applied to the incision.  Uterus was found to be firm and at the umbilicus.  Sponge, lap and needle counts were correct.  Pt taken to recovery in stable condition.     APGARS  8   9            GENDER  Male    FINDINGS: Normal uterus, tubes, and ovaries.  Compound presentation of fetal hand appreciated.  Minimal intraamniotic fluid noted at delivery.    Complications: None    Johanny Murrieta DO     Date: 2020  Time: 13:45 EST    Electronically signed by Johanny Murrieta DO at 20 1349          Physician Progress Notes (last 24 hours) (Notes from 20 0748 through 12/10/20 0748)      Johanny Murrieta DO at 20 1210        CTSP for repetitive deep variable decelerations.  Pitocin stopped and Brethine given.  SVE notes 3cm and compound presentation with fetal hand presenting.  I attempted to move hand but without resolution.  Pt counseled to proceed with  section.  Risk and benefit are reviewed.  She admits to understanding and agrees to proceed.     Electronically signed by Johanny Murrieta DO at 20 1212

## 2020-12-10 NOTE — PROGRESS NOTES
Primary Low Transverse  Section Progress Note      Hospital Course: G 2, now P 1102. Patient was admitted on 2020  7:40 AM with IUP at 36w0d weeks gestation secondary to Amniotic fluid leaking [O42.90]  Postpartum care and examination immediately after delivery [Z39.0]  Outcome of delivery, single liveborn [Z37.0]. Patient underwent a primary low transverse  section with BTL      Patient stable. No complaints.     signs in last 24 hours:    Vital Signs Range for the last 24 hours              Temp:  [97.5 °F (36.4 °C)-98.6 °F (37 °C)] 98.1 °F (36.7 °C)  Heart Rate:  [] 74  Resp:  [16-18] 18  BP: ()/(55-88) 108/64     Radiology     Imaging Results (Last 24 Hours)     ** No results found for the last 24 hours. **           Labs     Lab Results (last 24 hours)     Procedure Component Value Units Date/Time    CBC & Differential [[id number]]  (Abnormal) Collected: 12/10/20 0641    Specimen: Blood Updated: 12/10/20 0835    Narrative:      The following orders were created for panel order CBC & Differential.  Procedure                               Abnormality         Status                     ---------                               -----------         ------                     CBC Auto Differential[[id number]]        Abnormal            Final result                 Please view results for these tests on the individual orders.    CBC Auto Differential [[id number]]  (Abnormal) Collected: 12/10/20 0641    Specimen: Blood Updated: 12/10/20 0835     WBC 11.21 10*3/mm3      RBC 3.02 10*6/mm3      Hemoglobin 7.6 g/dL      Hematocrit 25.5 %      MCV 84.4 fL      MCH 25.2 pg      MCHC 29.8 g/dL      RDW 14.6 %      RDW-SD 44.5 fl      MPV 10.4 fL      Platelets 217 10*3/mm3      Neutrophil % 72.7 %      Lymphocyte % 17.8 %      Monocyte % 8.0 %      Eosinophil % 0.7 %      Basophil % 0.3 %      Immature Grans % 0.5 %      Neutrophils, Absolute 8.14 10*3/mm3      Lymphocytes, Absolute 2.00 10*3/mm3       Monocytes, Absolute 0.90 10*3/mm3      Eosinophils, Absolute 0.08 10*3/mm3      Basophils, Absolute 0.03 10*3/mm3      Immature Grans, Absolute 0.06 10*3/mm3      nRBC 0.0 /100 WBC     Urine Drug Screen - Urine, Catheter [[id number]]  (Normal) Collected: 12/09/20 1252    Specimen: Urine, Catheter Updated: 12/09/20 1335     Amphetamine Screen, Urine Negative     Barbiturates Screen, Urine Negative     Benzodiazepine Screen, Urine Negative     Cocaine Screen, Urine Negative     Methadone Screen, Urine Negative     Opiate Screen Negative     Phencyclidine (PCP), Urine Negative     THC, Screen, Urine Negative     6-ACETYL MORPHINE Negative     Buprenorphine, Screen, Urine Negative     Oxycodone Screen, Urine Negative    Narrative:      Negative Thresholds For Drugs Screened:                  Amphetamines              1000 ng/ml               Barbiturates               200 ng/ml               Benzodiazepines            200 ng/ml              Cocaine                    300 ng/ml              Methadone                  300 ng/ml              Opiates                    300 ng/ml               Phencyclidine               25 ng/ml               THC                         50 ng/ml              6-Acetyl Morphine           10 ng/ml              Buprenorphine                5 ng/ml              Oxycodone                  300 ng/ml    The reference range for all drugs tested is negative. This report includes final unconfirmed qualitative results to be used for medical treatment purposes only. Unconfirmed results must not be used for non-medical purposes such as employment or legal testing. Clinical consideration should be applied to any drug of abuse test, especially when unconfirmed quantitative results are used.        COVID-19,CEPHEID,COR/JUANA/PAD IN-HOUSE(OR EMERGENT/ADD-ON),NP SWAB IN TRANSPORT MEDIA 3-4 HR TAT - Swab, Nasopharynx [[id number]]  (Normal) Collected: 12/09/20 0900    Specimen: Swab from Nasopharynx Updated:  12/09/20 0953     COVID19 Not Detected    Narrative:      Fact sheet for providers: https://www.fda.gov/media/688326/download     Fact sheet for patients: https://www.fda.gov/media/984350/download    CBC & Differential [[id number]]  (Abnormal) Collected: 12/09/20 0853    Specimen: Blood Updated: 12/09/20 0925    Narrative:      The following orders were created for panel order CBC & Differential.  Procedure                               Abnormality         Status                     ---------                               -----------         ------                     CBC Auto Differential[[id number]]        Abnormal            Final result                 Please view results for these tests on the individual orders.    CBC Auto Differential [[id number]]  (Abnormal) Collected: 12/09/20 0853    Specimen: Blood Updated: 12/09/20 0925     WBC 13.31 10*3/mm3      RBC 3.76 10*6/mm3      Hemoglobin 9.2 g/dL      Hematocrit 30.9 %      MCV 82.2 fL      MCH 24.5 pg      MCHC 29.8 g/dL      RDW 14.3 %      RDW-SD 42.0 fl      MPV 10.2 fL      Platelets 263 10*3/mm3      Neutrophil % 77.9 %      Lymphocyte % 14.7 %      Monocyte % 6.1 %      Eosinophil % 0.2 %      Basophil % 0.3 %      Immature Grans % 0.8 %      Neutrophils, Absolute 10.38 10*3/mm3      Lymphocytes, Absolute 1.95 10*3/mm3      Monocytes, Absolute 0.81 10*3/mm3      Eosinophils, Absolute 0.03 10*3/mm3      Basophils, Absolute 0.04 10*3/mm3      Immature Grans, Absolute 0.10 10*3/mm3      nRBC 0.0 /100 WBC     PAMG-1, Rapid Assay For ROM - Amniotic Fluid, [[id number]]  (Abnormal) Collected: 12/09/20 0816    Specimen: Amniotic Fluid Updated: 12/09/20 0843     Rupture of Membranes Positive            Review of Systems    The following systems were reviewed and negative;  ENT, respiratory, cardiovascular, gastrointestinal, genitourinary, breast, endocrine and allergies / immunologic.      Physical Exam:      General Appearance:    Alert, cooperative, in no acute  distress   Head:    Normocephalic, without obvious abnormality, atraumatic   Eyes:            Lids and lashes normal, conjunctivae and sclerae normal, no   icterus, no pallor, corneas clear, PERRLA   Ears:    Ears appear intact with no abnormalities noted   Throat:   No oral lesions, no thrush, oral mucosa moist   Neck:   No adenopathy, supple, trachea midline, no thyromegaly, no     carotid bruit, no JVD   Back:     No kyphosis present, no scoliosis present, no skin lesions,       erythema or scars, no tenderness to percussion or                   palpation,   range of motion normal   Lungs:     Clear to auscultation,respirations regular, even and                   unlabored    Heart:    Regular rhythm and normal rate, normal S1 and S2, no            murmur, no gallop, no rub, no click   Breast Exam:    Deferred   Abdomen:     Normal bowel sounds, no masses, no organomegaly, soft        non-tender, non-distended, no guarding, no rebound                 tenderness   Genitalia:    Deferred   Extremities:   Moves all extremities well, no edema, no cyanosis, no              redness   Pulses:   Pulses palpable and equal bilaterally   Skin:   No bleeding, bruising or rash   Lymph nodes:   No palpable adenopathy   Neurologic:   Cranial nerves 2 - 12 grossly intact, sensation intact, DTR        present and equal bilaterally                 Assessment:  1.  Primary Low Transverse  Section. BTL. POD#1. Patient doing well. No complaints.     Plan:  1. Will continue current plan of care and anticipate discharge to home in the AM.      Hua Asencio III, MD  12/10/20  08:37 EST

## 2020-12-11 VITALS
HEIGHT: 67 IN | SYSTOLIC BLOOD PRESSURE: 126 MMHG | OXYGEN SATURATION: 98 % | TEMPERATURE: 98.9 F | RESPIRATION RATE: 18 BRPM | HEART RATE: 90 BPM | WEIGHT: 218.3 LBS | BODY MASS INDEX: 34.26 KG/M2 | DIASTOLIC BLOOD PRESSURE: 70 MMHG

## 2020-12-11 PROBLEM — R71.0 DECREASED HEMOGLOBIN: Status: ACTIVE | Noted: 2020-12-11

## 2020-12-11 PROBLEM — O42.90 AMNIOTIC FLUID LEAKING: Status: RESOLVED | Noted: 2020-12-09 | Resolved: 2020-12-11

## 2020-12-11 RX ORDER — IBUPROFEN 800 MG/1
800 TABLET ORAL EVERY 8 HOURS PRN
Qty: 40 TABLET | Refills: 1 | Status: SHIPPED | OUTPATIENT
Start: 2020-12-11

## 2020-12-11 RX ORDER — SIMETHICONE 80 MG
80 TABLET,CHEWABLE ORAL 4 TIMES DAILY PRN
Qty: 40 TABLET | Refills: 1 | Status: SHIPPED | OUTPATIENT
Start: 2020-12-11 | End: 2021-12-17

## 2020-12-11 RX ORDER — PSEUDOEPHEDRINE HCL 30 MG
100 TABLET ORAL 2 TIMES DAILY PRN
Qty: 40 CAPSULE | Refills: 1 | Status: SHIPPED | OUTPATIENT
Start: 2020-12-11 | End: 2021-12-17

## 2020-12-11 RX ORDER — FERROUS SULFATE 325(65) MG
325 TABLET ORAL 2 TIMES DAILY WITH MEALS
Qty: 60 TABLET | Refills: 1 | Status: SHIPPED | OUTPATIENT
Start: 2020-12-11 | End: 2021-12-17

## 2020-12-11 RX ORDER — OXYCODONE HYDROCHLORIDE AND ACETAMINOPHEN 5; 325 MG/1; MG/1
1 TABLET ORAL EVERY 4 HOURS PRN
Qty: 15 TABLET | Refills: 0 | Status: SHIPPED | OUTPATIENT
Start: 2020-12-11 | End: 2020-12-14

## 2020-12-11 RX ADMIN — OXYCODONE HYDROCHLORIDE AND ACETAMINOPHEN 1 TABLET: 5; 325 TABLET ORAL at 09:42

## 2020-12-11 RX ADMIN — IBUPROFEN 800 MG: 800 TABLET, FILM COATED ORAL at 11:14

## 2020-12-11 RX ADMIN — OXYCODONE HYDROCHLORIDE AND ACETAMINOPHEN 1 TABLET: 5; 325 TABLET ORAL at 06:04

## 2020-12-11 RX ADMIN — SIMETHICONE 80 MG: 80 TABLET, CHEWABLE ORAL at 06:04

## 2020-12-11 RX ADMIN — OXYCODONE HYDROCHLORIDE AND ACETAMINOPHEN 1 TABLET: 5; 325 TABLET ORAL at 01:47

## 2020-12-11 RX ADMIN — DOCUSATE SODIUM 100 MG: 100 CAPSULE ORAL at 09:42

## 2020-12-11 NOTE — DISCHARGE SUMMARY
[facility]  Delivery Discharge Summary    Primary OB Clinician:     EDC: Estimated Date of Delivery: 21    Gestational Age:36w0d    Antepartum complications: anemia    Date of Delivery: 2020   Time of Delivery: 1:08 PM     Delivered By:  Johanny Murrieta     Delivery Type: , Low Transverse      Tubal Ligation: done with c/section    Baby: Male  Apgar:  8  @ 1 minute /   Apgar:  9  @ 5 minutes   Weight: 6lb 1.9oz    Anesthesia: Spinal      Intrapartum complications: None    Subjective     Subjective   Post-Op Day 2 S/P   Subjective  Patient reports:  Pain is controlled with ibuprofen (OTC) and narcotic analgesics including Percocet.  She is up out of bed this am. Tolerating diet. Tolerating po -- normal. Voiding - without difficulty; flatus reported..  Vaginal bleeding is as much as expected.    Breastfeeding: declines.    Objective    Objective  Vitals: Vital Signs Range for the last 24 hours  Temperature: Temp:  [98.5 °F (36.9 °C)] 98.5 °F (36.9 °C)   Temp Source: Temp src: Oral   BP: BP: (128)/(67) 128/67   Pulse: Heart Rate:  [79] 79   Respirations: Resp:  [18] 18   Weight:              Physical Exam    Lungs clear to auscultation bilaterally   Abdomen Soft, ND, tender along incision. + BS   Incision  well approximated   Extremities extremities normal, atraumatic, no cyanosis + edema equal bilaterally no erythema or warmth.       [unfilled]       Lab Results   Component Value Date    ABO A 2020    RH Positive 2020        Lab Results   Component Value Date    HGB 7.6 (L) 12/10/2020    HCT 25.5 (L) 12/10/2020       Assesment and Plan:       Outcome of delivery, single liveborn    Postpartum care following  delivery    Decreased hemoglobin    Assessment & Plan    Assessment:    Kenia Pollack is Day 2  post-partum  , Low Transverse   .      Plan:  continue post op care and plan for discharge today.  Will send home on ferrous sulfate for decreased hemoglobin.  Social  services has already seen patient.  Follow-up appointment with [facility] in 2 weeks.    Discharge Date: 12/11/2020; Discharge Time: 08:54 EST        SHELLY Farr  12/11/2020  08:53 EST

## 2020-12-11 NOTE — PROGRESS NOTES
MIGDALIA Vega   PROGRESS NOTE    Post-Op Day 2 S/P   Subjective   Subjective  Patient reports:  Pain is controlled with ibuprofen (OTC) and narcotic analgesics including Percocet.  She is up out of bed this am. Tolerating diet. Tolerating po -- normal. Voiding - without difficulty; flatus reported..  Vaginal bleeding is as much as expected.    Objective    Objective     Vitals: Vital Signs Range for the last 24 hours  Temperature: Temp:  [98.5 °F (36.9 °C)] 98.5 °F (36.9 °C)   Temp Source: Temp src: Oral   BP: BP: (128)/(67) 128/67   Pulse: Heart Rate:  [79] 79   Respirations: Resp:  [18] 18   Weight:              Physical Exam    Lungs clear to auscultation bilaterally   Abdomen Soft, ND, tender along incision. + BS   Incision  well approximated   Extremities extremities normal, atraumatic, no cyanosis or edema         [unfilled]       Lab Results   Component Value Date    ABO A 2020    RH Positive 2020        Lab Results   Component Value Date    HGB 7.6 (L) 12/10/2020    HCT 25.5 (L) 12/10/2020       Assessment/Plan        Outcome of delivery, single liveborn    Postpartum care following  delivery    Decreased hemoglobin    Assessment & Plan    Assessment:    Kenia Pollack is Day 2  post-partum  , Low Transverse   .      Plan:  continue post op care and plan for discharge today.      SHELLY Farr  20  08:58 EST

## 2020-12-11 NOTE — PLAN OF CARE
Goal Outcome Evaluation:  Plan of Care Reviewed With: patient  Progress: improving  Outcome Summary: VSS; Pt ambulating in hallway.  C/O incisional discomfort.  Percocet 5 mg given for pain control as needed.  FF u/2.

## 2020-12-16 NOTE — DISCHARGE SUMMARY
Date of Discharge: 20     Discharge Diagnosis:   34-week intrauterine pregnancy  Flank pain  Possible renal calculi   contractions    Problem List:    Pregnancy    Renal calculi      Presenting Problem/History of Present Illness  Pregnancy [Z34.90]  Renal calculi [N20.0]  Renal calculi [N20.0]      Hospital Course  Patient is a 25 y.o. female presented with flank pain.  She had previously been admitted for this about a week prior.  It had resolved and now is back.  She had a renal ultrasound that showed moderate dilation of the collecting system.  The patient was having severe pain and IV narcotics were given.  Eventually the pain resolved.  No stone was seen.  Since the pain was completely resolved she was discharged home.  Urology did come by to see her but since her pain was gone there were no other recommendations.    Procedures Performed         Consults:   Consults     No orders found from 10/29/2020 to 2020.          Pertinent Test Results:    Condition on Discharge: Stable  Vital Signs       Discharge Disposition  Home or Self Care    Discharge Medications     Discharge Medications      Stop These Medications    nitrofurantoin 100 MG capsule  Commonly known as: Macrodantin     oxyCODONE 5 MG immediate release tablet  Commonly known as: Roxicodone            Discharge Diet   Regular    Activity at Discharge  As tolerated    Follow-up Appointments  No future appointments.        Time: Discharge 15 min

## 2021-03-18 ENCOUNTER — BULK ORDERING (OUTPATIENT)
Dept: CASE MANAGEMENT | Facility: OTHER | Age: 26
End: 2021-03-18

## 2021-03-18 DIAGNOSIS — Z23 IMMUNIZATION DUE: ICD-10-CM

## 2021-12-13 ENCOUNTER — OFFICE VISIT (OUTPATIENT)
Dept: UROLOGY | Facility: CLINIC | Age: 26
End: 2021-12-13

## 2021-12-13 ENCOUNTER — HOSPITAL ENCOUNTER (OUTPATIENT)
Dept: GENERAL RADIOLOGY | Facility: HOSPITAL | Age: 26
Discharge: HOME | End: 2021-12-13
Admitting: UROLOGY

## 2021-12-13 VITALS — BODY MASS INDEX: 38.77 KG/M2 | WEIGHT: 247 LBS | HEIGHT: 67 IN

## 2021-12-13 DIAGNOSIS — N20.0 RENAL CALCULI: Primary | ICD-10-CM

## 2021-12-13 DIAGNOSIS — N20.1 RIGHT DISTAL URETERAL CALCULUS: Primary | ICD-10-CM

## 2021-12-13 PROCEDURE — 99214 OFFICE O/P EST MOD 30 MIN: CPT | Performed by: UROLOGY

## 2021-12-13 PROCEDURE — 74018 RADEX ABDOMEN 1 VIEW: CPT | Performed by: RADIOLOGY

## 2021-12-13 PROCEDURE — 74018 RADEX ABDOMEN 1 VIEW: CPT

## 2021-12-13 RX ORDER — HYDROCODONE BITARTRATE AND ACETAMINOPHEN 10; 325 MG/1; MG/1
1 TABLET ORAL EVERY 6 HOURS PRN
Qty: 16 TABLET | Refills: 0 | Status: SHIPPED | OUTPATIENT
Start: 2021-12-13

## 2021-12-13 RX ORDER — OXYCODONE AND ACETAMINOPHEN 10; 325 MG/1; MG/1
1 TABLET ORAL EVERY 8 HOURS PRN
COMMUNITY
End: 2021-12-17

## 2021-12-13 RX ORDER — PROMETHAZINE HYDROCHLORIDE 25 MG/1
25 TABLET ORAL EVERY 6 HOURS PRN
Qty: 21 TABLET | Refills: 2 | Status: SHIPPED | OUTPATIENT
Start: 2021-12-13

## 2021-12-13 RX ORDER — TAMSULOSIN HYDROCHLORIDE 0.4 MG/1
1 CAPSULE ORAL NIGHTLY
Qty: 30 CAPSULE | Refills: 5 | Status: SHIPPED | OUTPATIENT
Start: 2021-12-13

## 2021-12-13 RX ORDER — ONDANSETRON 4 MG/1
4 TABLET, FILM COATED ORAL EVERY 8 HOURS PRN
COMMUNITY

## 2021-12-13 RX ORDER — TAMSULOSIN HYDROCHLORIDE 0.4 MG/1
1 CAPSULE ORAL DAILY
COMMUNITY
End: 2021-12-17

## 2021-12-14 PROBLEM — N20.1 RIGHT DISTAL URETERAL CALCULUS: Status: ACTIVE | Noted: 2021-12-14

## 2021-12-14 RX ORDER — GENTAMICIN SULFATE 80 MG/100ML
80 INJECTION, SOLUTION INTRAVENOUS ONCE
Status: CANCELLED | OUTPATIENT
Start: 2021-12-20 | End: 2021-12-14

## 2021-12-14 NOTE — H&P (VIEW-ONLY)
Chief Complaint:          Chief Complaint   Patient presents with   • Establish Care     kidney stone        HPI:   26 y.o. female referred for evaluation accompanied by her .  She has had several prior stones.  She presented to the [facility] in the emergency department on 12/12/2021 with severe right-sided colicky flank pain.  Causing significant history.  She had a positive urine for red cells and white cells she was afebrile I reviewed her CT extensively and obtained a KUB showing a 7 mm distal ureteral calculus.  She we discussed the chance of spontaneous passage given the size I doubt this is a possibility and I will recommend ureteroscopy as soon as possible this will be next Monday discussed with her at great length she has adequate pain medication, nausea medication and Flomax for medical expulsive therapy.  She has no absolute indicators for intervention at this juncture    Past Medical History:        Past Medical History:   Diagnosis Date   • Decreased hemoglobin 12/11/2020   • Hypertension     At age 18   • Kidney stone    • Urinary tract infection          Current Meds:     Current Outpatient Medications   Medication Sig Dispense Refill   • docusate sodium 100 MG capsule Take 1 capsule by mouth 2 (Two) Times a Day As Needed for Constipation. 40 capsule 1   • ferrous sulfate 325 (65 FE) MG tablet Take 1 tablet by mouth 2 (Two) Times a Day With Meals. 60 tablet 1   • ibuprofen (ADVIL,MOTRIN) 800 MG tablet Take 1 tablet by mouth Every 8 (Eight) Hours As Needed for Mild Pain . 40 tablet 1   • ondansetron (ZOFRAN) 4 MG tablet Take 4 mg by mouth Every 8 (Eight) Hours As Needed for Nausea or Vomiting.     • oxyCODONE-acetaminophen (PERCOCET)  MG per tablet Take 1 tablet by mouth Every 8 (Eight) Hours As Needed for Moderate Pain .     • simethicone (MYLICON) 80 MG chewable tablet Chew and swallow 1 tablet 4 (Four) Times a Day As Needed for Flatulence. 40 tablet 1   • tamsulosin (FLOMAX) 0.4 MG  capsule 24 hr capsule Take 1 capsule by mouth Daily.     • HYDROcodone-acetaminophen (Norco)  MG per tablet Take 1 tablet by mouth Every 6 (Six) Hours As Needed for Moderate Pain . 16 tablet 0   • promethazine (PHENERGAN) 25 MG tablet Take 1 tablet by mouth Every 6 (Six) Hours As Needed for Nausea or Vomiting. 21 tablet 2   • tamsulosin (Flomax) 0.4 MG capsule 24 hr capsule Take 1 capsule by mouth Every Night. 30 capsule 5     No current facility-administered medications for this visit.        Allergies:      No Known Allergies     Past Surgical History:     Past Surgical History:   Procedure Laterality Date   •  SECTION WITH TUBAL Bilateral 2020    Procedure:  SECTION PRIMARY WITH TUBAL;  Surgeon: Johanny Murrieta DO;  Location: [facility] LABOR DELIVERY;  Service: Obstetrics/Gynecology;  Laterality: Bilateral;   • KIDNEY STONE SURGERY      Kidney stone removal   • LAPAROSCOPIC CHOLECYSTECTOMY     • TONSILLECTOMY           Social History:     Social History     Socioeconomic History   • Marital status:    Tobacco Use   • Smoking status: Former Smoker   • Smokeless tobacco: Never Used   Vaping Use   • Vaping Use: Never used   Substance and Sexual Activity   • Alcohol use: Never   • Drug use: Never   • Sexual activity: Yes     Partners: Male       Family History:     Family History   Problem Relation Age of Onset   • Ovarian cancer Paternal Grandmother    • Hypertension Maternal Grandmother    • Breast cancer Paternal Aunt        Review of Systems:     Review of Systems   Constitutional: Negative.  Negative for activity change, appetite change, chills, diaphoresis, fatigue and unexpected weight change.   HENT: Negative for congestion, dental problem, drooling, ear discharge, ear pain, facial swelling, hearing loss, mouth sores, nosebleeds, postnasal drip, rhinorrhea, sinus pressure, sneezing, sore throat, tinnitus, trouble swallowing and voice change.    Eyes: Negative.  Negative  for photophobia, pain, discharge, redness, itching and visual disturbance.   Respiratory: Negative.  Negative for apnea, cough, choking, chest tightness, shortness of breath, wheezing and stridor.    Cardiovascular: Negative.  Negative for chest pain, palpitations and leg swelling.   Gastrointestinal: Negative.  Negative for abdominal distention, abdominal pain, anal bleeding, blood in stool, constipation, diarrhea, nausea, rectal pain and vomiting.   Endocrine: Negative.  Negative for cold intolerance, heat intolerance, polydipsia, polyphagia and polyuria.   Genitourinary: Positive for flank pain, frequency, pelvic pain and urgency.   Musculoskeletal: Negative for arthralgias, back pain, gait problem, joint swelling, myalgias, neck pain and neck stiffness.   Skin: Negative.  Negative for color change, pallor, rash and wound.   Allergic/Immunologic: Negative.  Negative for environmental allergies, food allergies and immunocompromised state.   Neurological: Negative.  Negative for dizziness, tremors, seizures, syncope, facial asymmetry, speech difficulty, weakness, light-headedness, numbness and headaches.   Hematological: Negative.  Negative for adenopathy. Does not bruise/bleed easily.   Psychiatric/Behavioral: Negative for agitation, behavioral problems, confusion, decreased concentration, dysphoric mood, hallucinations, self-injury, sleep disturbance and suicidal ideas. The patient is not nervous/anxious and is not hyperactive.    All other systems reviewed and are negative.      Physical Exam:     Physical Exam  Constitutional:       Appearance: She is well-developed.   HENT:      Head: Normocephalic and atraumatic.      Right Ear: External ear normal.      Left Ear: External ear normal.   Eyes:      Conjunctiva/sclera: Conjunctivae normal.      Pupils: Pupils are equal, round, and reactive to light.   Cardiovascular:      Rate and Rhythm: Normal rate and regular rhythm.      Heart sounds: Normal heart sounds.    Pulmonary:      Effort: Pulmonary effort is normal.      Breath sounds: Normal breath sounds.   Abdominal:      General: Bowel sounds are normal. There is no distension.      Palpations: Abdomen is soft. There is no mass.      Tenderness: There is no abdominal tenderness. There is no guarding or rebound.   Genitourinary:     Vagina: No vaginal discharge.   Musculoskeletal:         General: Normal range of motion.   Skin:     General: Skin is warm and dry.   Neurological:      Mental Status: She is alert.      Deep Tendon Reflexes: Reflexes are normal and symmetric.   Psychiatric:         Behavior: Behavior normal.         Thought Content: Thought content normal.         Judgment: Judgment normal.         I have reviewed the following portions of the patient's history: Allergies, current medications, past family history, past medical history, past social history, past surgical history, problem list, and ROS and confirm it is accurate.      Procedure:       Assessment/Plan:   Ureteral calculus-patient has been diagnosed with a ureteral calculus.  We have discussed the various parameters regarding spontaneous passage including the notion that a 2 mm stone has a high likelihood of spontaneous passage versus a larger stone being caught up in the upper areas of the urinary tract.  We also discussed the medical management of stone disease and the use of medical expulsive therapy in the form of Flomax.  This is used in an off label setting.  We discussed the indicators for intervention including  absolute indicators such as sepsis and uncontrollable severe pain, as well as the relative indicators of moderate pain that is well-controlled with various analgesia.  I also talked about nonoperative management including ambulation and increasing fluids and hot tub as being an effective adjuncts in the treatment of a ureteral stone.  She has a right distal 7 mm stone with a low likelihood of spontaneous passage I recommended  uteroscopy next week as soon as possible.  Narcotic pain medication-patient has significant acute pain that I believe would be an indication for the use of narcotic pain medication.  I discussed the significant risks of pain medication and the fact that this will be a short only option and I will give her no more than a three-day supply of pain medication, I will not plan long-term medication, and that this will be sent to a pain clinic if at all becomes necessary.  We discussed signing a pain medication agreement and the fact that we're going to run a state AMANDA review to be sure the patient is not getting pain medication from elsewhere.  If this is the case, we will not give pain medication as part of the patient's treatment plan of there being prescribed a controlled substance with potential for abuse.  This patient has been well aware of the appropriate dose of such medications including the risks for somnolence, limited ability to drive and/or safety and the significant potential for overdose.  It has been made clear that these medications are for the prescribed patient only without concomitant use of alcohol or other substance unless prescribed by the medical provider.  Has completed prescribing agreement detailing the terms of continue prescribing him a controlled substance including monitoring Amanda reports, the possibility of urine drug screens, and pill counts.  The patient is aware that we review AMANDA reports on a regular basis and scan them into the chart.  History and physical examination exhibited continued safe and appropriate use of controlled substances. We also discussed the fact that the new [place] legislation allows only a three-day prescription for pain medication.  In this situation he will be referred to a chronic pain clinic.                This document has been electronically signed by FARZANA ANDREWS MD December 14, 2021 07:19 EST

## 2021-12-14 NOTE — PROGRESS NOTES
Chief Complaint:          Chief Complaint   Patient presents with   • Establish Care     kidney stone        HPI:   26 y.o. female referred for evaluation accompanied by her .  She has had several prior stones.  She presented to the [facility] in the emergency department on 12/12/2021 with severe right-sided colicky flank pain.  Causing significant history.  She had a positive urine for red cells and white cells she was afebrile I reviewed her CT extensively and obtained a KUB showing a 7 mm distal ureteral calculus.  She we discussed the chance of spontaneous passage given the size I doubt this is a possibility and I will recommend ureteroscopy as soon as possible this will be next Monday discussed with her at great length she has adequate pain medication, nausea medication and Flomax for medical expulsive therapy.  She has no absolute indicators for intervention at this juncture    Past Medical History:        Past Medical History:   Diagnosis Date   • Decreased hemoglobin 12/11/2020   • Hypertension     At age 18   • Kidney stone    • Urinary tract infection          Current Meds:     Current Outpatient Medications   Medication Sig Dispense Refill   • docusate sodium 100 MG capsule Take 1 capsule by mouth 2 (Two) Times a Day As Needed for Constipation. 40 capsule 1   • ferrous sulfate 325 (65 FE) MG tablet Take 1 tablet by mouth 2 (Two) Times a Day With Meals. 60 tablet 1   • ibuprofen (ADVIL,MOTRIN) 800 MG tablet Take 1 tablet by mouth Every 8 (Eight) Hours As Needed for Mild Pain . 40 tablet 1   • ondansetron (ZOFRAN) 4 MG tablet Take 4 mg by mouth Every 8 (Eight) Hours As Needed for Nausea or Vomiting.     • oxyCODONE-acetaminophen (PERCOCET)  MG per tablet Take 1 tablet by mouth Every 8 (Eight) Hours As Needed for Moderate Pain .     • simethicone (MYLICON) 80 MG chewable tablet Chew and swallow 1 tablet 4 (Four) Times a Day As Needed for Flatulence. 40 tablet 1   • tamsulosin (FLOMAX) 0.4 MG  capsule 24 hr capsule Take 1 capsule by mouth Daily.     • HYDROcodone-acetaminophen (Norco)  MG per tablet Take 1 tablet by mouth Every 6 (Six) Hours As Needed for Moderate Pain . 16 tablet 0   • promethazine (PHENERGAN) 25 MG tablet Take 1 tablet by mouth Every 6 (Six) Hours As Needed for Nausea or Vomiting. 21 tablet 2   • tamsulosin (Flomax) 0.4 MG capsule 24 hr capsule Take 1 capsule by mouth Every Night. 30 capsule 5     No current facility-administered medications for this visit.        Allergies:      No Known Allergies     Past Surgical History:     Past Surgical History:   Procedure Laterality Date   •  SECTION WITH TUBAL Bilateral 2020    Procedure:  SECTION PRIMARY WITH TUBAL;  Surgeon: Johanny Murrieta DO;  Location: [facility] LABOR DELIVERY;  Service: Obstetrics/Gynecology;  Laterality: Bilateral;   • KIDNEY STONE SURGERY      Kidney stone removal   • LAPAROSCOPIC CHOLECYSTECTOMY     • TONSILLECTOMY           Social History:     Social History     Socioeconomic History   • Marital status:    Tobacco Use   • Smoking status: Former Smoker   • Smokeless tobacco: Never Used   Vaping Use   • Vaping Use: Never used   Substance and Sexual Activity   • Alcohol use: Never   • Drug use: Never   • Sexual activity: Yes     Partners: Male       Family History:     Family History   Problem Relation Age of Onset   • Ovarian cancer Paternal Grandmother    • Hypertension Maternal Grandmother    • Breast cancer Paternal Aunt        Review of Systems:     Review of Systems   Constitutional: Negative.  Negative for activity change, appetite change, chills, diaphoresis, fatigue and unexpected weight change.   HENT: Negative for congestion, dental problem, drooling, ear discharge, ear pain, facial swelling, hearing loss, mouth sores, nosebleeds, postnasal drip, rhinorrhea, sinus pressure, sneezing, sore throat, tinnitus, trouble swallowing and voice change.    Eyes: Negative.  Negative  for photophobia, pain, discharge, redness, itching and visual disturbance.   Respiratory: Negative.  Negative for apnea, cough, choking, chest tightness, shortness of breath, wheezing and stridor.    Cardiovascular: Negative.  Negative for chest pain, palpitations and leg swelling.   Gastrointestinal: Negative.  Negative for abdominal distention, abdominal pain, anal bleeding, blood in stool, constipation, diarrhea, nausea, rectal pain and vomiting.   Endocrine: Negative.  Negative for cold intolerance, heat intolerance, polydipsia, polyphagia and polyuria.   Genitourinary: Positive for flank pain, frequency, pelvic pain and urgency.   Musculoskeletal: Negative for arthralgias, back pain, gait problem, joint swelling, myalgias, neck pain and neck stiffness.   Skin: Negative.  Negative for color change, pallor, rash and wound.   Allergic/Immunologic: Negative.  Negative for environmental allergies, food allergies and immunocompromised state.   Neurological: Negative.  Negative for dizziness, tremors, seizures, syncope, facial asymmetry, speech difficulty, weakness, light-headedness, numbness and headaches.   Hematological: Negative.  Negative for adenopathy. Does not bruise/bleed easily.   Psychiatric/Behavioral: Negative for agitation, behavioral problems, confusion, decreased concentration, dysphoric mood, hallucinations, self-injury, sleep disturbance and suicidal ideas. The patient is not nervous/anxious and is not hyperactive.    All other systems reviewed and are negative.      Physical Exam:     Physical Exam  Constitutional:       Appearance: She is well-developed.   HENT:      Head: Normocephalic and atraumatic.      Right Ear: External ear normal.      Left Ear: External ear normal.   Eyes:      Conjunctiva/sclera: Conjunctivae normal.      Pupils: Pupils are equal, round, and reactive to light.   Cardiovascular:      Rate and Rhythm: Normal rate and regular rhythm.      Heart sounds: Normal heart sounds.    Pulmonary:      Effort: Pulmonary effort is normal.      Breath sounds: Normal breath sounds.   Abdominal:      General: Bowel sounds are normal. There is no distension.      Palpations: Abdomen is soft. There is no mass.      Tenderness: There is no abdominal tenderness. There is no guarding or rebound.   Genitourinary:     Vagina: No vaginal discharge.   Musculoskeletal:         General: Normal range of motion.   Skin:     General: Skin is warm and dry.   Neurological:      Mental Status: She is alert.      Deep Tendon Reflexes: Reflexes are normal and symmetric.   Psychiatric:         Behavior: Behavior normal.         Thought Content: Thought content normal.         Judgment: Judgment normal.         I have reviewed the following portions of the patient's history: Allergies, current medications, past family history, past medical history, past social history, past surgical history, problem list, and ROS and confirm it is accurate.      Procedure:       Assessment/Plan:   Ureteral calculus-patient has been diagnosed with a ureteral calculus.  We have discussed the various parameters regarding spontaneous passage including the notion that a 2 mm stone has a high likelihood of spontaneous passage versus a larger stone being caught up in the upper areas of the urinary tract.  We also discussed the medical management of stone disease and the use of medical expulsive therapy in the form of Flomax.  This is used in an off label setting.  We discussed the indicators for intervention including  absolute indicators such as sepsis and uncontrollable severe pain, as well as the relative indicators of moderate pain that is well-controlled with various analgesia.  I also talked about nonoperative management including ambulation and increasing fluids and hot tub as being an effective adjuncts in the treatment of a ureteral stone.  She has a right distal 7 mm stone with a low likelihood of spontaneous passage I recommended  uteroscopy next week as soon as possible.  Narcotic pain medication-patient has significant acute pain that I believe would be an indication for the use of narcotic pain medication.  I discussed the significant risks of pain medication and the fact that this will be a short only option and I will give her no more than a three-day supply of pain medication, I will not plan long-term medication, and that this will be sent to a pain clinic if at all becomes necessary.  We discussed signing a pain medication agreement and the fact that we're going to run a state AMANDA review to be sure the patient is not getting pain medication from elsewhere.  If this is the case, we will not give pain medication as part of the patient's treatment plan of there being prescribed a controlled substance with potential for abuse.  This patient has been well aware of the appropriate dose of such medications including the risks for somnolence, limited ability to drive and/or safety and the significant potential for overdose.  It has been made clear that these medications are for the prescribed patient only without concomitant use of alcohol or other substance unless prescribed by the medical provider.  Has completed prescribing agreement detailing the terms of continue prescribing him a controlled substance including monitoring Amanda reports, the possibility of urine drug screens, and pill counts.  The patient is aware that we review AMANDA reports on a regular basis and scan them into the chart.  History and physical examination exhibited continued safe and appropriate use of controlled substances. We also discussed the fact that the new [place] legislation allows only a three-day prescription for pain medication.  In this situation he will be referred to a chronic pain clinic.                This document has been electronically signed by FARZANA ANDREWS MD December 14, 2021 07:19 EST

## 2021-12-16 ENCOUNTER — PATIENT ROUNDING (BHMG ONLY) (OUTPATIENT)
Dept: GASTROENTEROLOGY | Facility: CLINIC | Age: 26
End: 2021-12-16

## 2021-12-16 NOTE — PROGRESS NOTES
December 16, 2021    Hello, may I speak with Kenia Mcdonald?    My name is April      I am  with [facility]  [address] [place] [address] [phone].    Before we get started may I verify your date of birth? 1995    I am calling to officially welcome you to our practice and ask about your recent visit. Is this a good time to talk? yes    Tell me about your visit with us. What things went well?  No complaints, visit went well.       We're always looking for ways to make our patients' experiences even better. Do you have recommendations on ways we may improve?  no    Overall were you satisfied with your first visit to our practice? yes       I appreciate you taking the time to speak with me today. Is there anything else I can do for you? no      Thank you, and have a great day.

## 2021-12-17 ENCOUNTER — LAB (OUTPATIENT)
Dept: LAB | Facility: HOSPITAL | Age: 26
End: 2021-12-17

## 2021-12-17 ENCOUNTER — PRE-ADMISSION TESTING (OUTPATIENT)
Dept: PREADMISSION TESTING | Facility: HOSPITAL | Age: 26
End: 2021-12-17

## 2021-12-17 DIAGNOSIS — N20.1 RIGHT DISTAL URETERAL CALCULUS: ICD-10-CM

## 2021-12-17 LAB
ANION GAP SERPL CALCULATED.3IONS-SCNC: 13.1 MMOL/L (ref 5–15)
BUN SERPL-MCNC: 12 MG/DL (ref 6–20)
BUN/CREAT SERPL: 16.4 (ref 7–25)
CALCIUM SPEC-SCNC: 9.5 MG/DL (ref 8.6–10.5)
CHLORIDE SERPL-SCNC: 103 MMOL/L (ref 98–107)
CO2 SERPL-SCNC: 24.9 MMOL/L (ref 22–29)
CREAT SERPL-MCNC: 0.73 MG/DL (ref 0.57–1)
DEPRECATED RDW RBC AUTO: 43.7 FL (ref 37–54)
ERYTHROCYTE [DISTWIDTH] IN BLOOD BY AUTOMATED COUNT: 14.6 % (ref 12.3–15.4)
GFR SERPL CREATININE-BSD FRML MDRD: 98 ML/MIN/1.73
GLUCOSE SERPL-MCNC: 90 MG/DL (ref 65–99)
HCT VFR BLD AUTO: 39.6 % (ref 34–46.6)
HGB BLD-MCNC: 11.9 G/DL (ref 12–15.9)
MCH RBC QN AUTO: 24.7 PG (ref 26.6–33)
MCHC RBC AUTO-ENTMCNC: 30.1 G/DL (ref 31.5–35.7)
MCV RBC AUTO: 82.2 FL (ref 79–97)
PLATELET # BLD AUTO: 325 10*3/MM3 (ref 140–450)
PMV BLD AUTO: 9.6 FL (ref 6–12)
POTASSIUM SERPL-SCNC: 3.9 MMOL/L (ref 3.5–5.2)
RBC # BLD AUTO: 4.82 10*6/MM3 (ref 3.77–5.28)
SARS-COV-2 RNA PNL SPEC NAA+PROBE: NOT DETECTED
SODIUM SERPL-SCNC: 141 MMOL/L (ref 136–145)
WBC NRBC COR # BLD: 5.64 10*3/MM3 (ref 3.4–10.8)

## 2021-12-17 PROCEDURE — 80048 BASIC METABOLIC PNL TOTAL CA: CPT

## 2021-12-17 PROCEDURE — U0004 COV-19 TEST NON-CDC HGH THRU: HCPCS | Performed by: UROLOGY

## 2021-12-17 PROCEDURE — C9803 HOPD COVID-19 SPEC COLLECT: HCPCS | Performed by: UROLOGY

## 2021-12-17 PROCEDURE — 36415 COLL VENOUS BLD VENIPUNCTURE: CPT

## 2021-12-17 PROCEDURE — 85027 COMPLETE CBC AUTOMATED: CPT

## 2021-12-17 NOTE — DISCHARGE INSTRUCTIONS
TAKE the following medications the morning of surgery:  All heart or blood pressure medications    HOLD all diabetic medications the morning of surgery as ordered by physician.    Please discontinue all blood thinners and anticoagulants (except aspirin) prior to surgery as per your surgeon and cardiologist instructions.  Aspirin may be continued up to the day prior to surgery.     12/20/21  ARRIVAL TIME PER DR Stratton's OFFICE    General Instructions:  · Do not eat or drink after midnight: 12/19/21 includes water, mints, or gum. You may brush your teeth.  Dental appliances that are removable must be taken out day of surgery.  · Do not smoke, chew tobacco, or drink alcohol 24 hours prior to surgery.  · Bring medications in original bottles, any inhalers and if applicable your C-PAP/BI-PAP machine.  · Bring any papers given to you in the doctor's office.  · Wear clean comfortable clothes and socks.  · Do not wear contact lenses or make-up. Bring a case for your glasses if applicable.  · Bring crutches or walker if applicable.  · Leave all other valuables and jewelry at home.    If you were given a blood bank ID arm band remember to bring it with you the day of surgery.    Preventing a Surgical Site Infection:  Shower the night before surgery (unless instructed other wise) using a fresh bar of anti-bacterial soap (such as Dial) and clean washcloth. Dry with a clean towel and dress in clean clothing.  For 2 to 3 days before surgery, avoid shaving with a razor near where you will have surgery because the razor can irritate skin and make it easier to develop an infection. Ask your surgeon if you will be receiving antibiotics prior to surgery.  Make sure you, your family, and all healthcare providers clear their hands with soap and water or an alcohol-based hand  before caring for you or your wound.  If at all possible, quit smoking as many days before surgery as you can.    Day of surgery:  Upon arrival, a  Pre-op nurse and Anesthesiologist will review your health history, obtain vital signs, and answer questions you may have. The only belongings needed at this time will be your home medications and if applicable your C-PAP/BI-PAP machine. If you are staying overnight your family can leave the rest of your belongings in the car and bring them to your room later. A Pre-op nurse will start an IV and you may receive medication in preparation for surgery, including something to help you relax. Your family will be able to see you in the Pre-op area. While you are in surgery your family should notify the waiting room  if they leave the waiting room area and provide a contact phone number.    Please be aware that surgery does come with discomfort. We want to make every effort to control your discomfort so please discuss any uncontrolled symptoms with your nurse. Your doctor will most likely have prescribed pain medications.    If you are going home after surgery you will receive individualized written care instructions before being discharged. A responsible adult must drive you to and from the hospital on the day of surgery and stay with you for 24 hours.    If you are staying overnight following surgery, you will be transported to your hospital room following the recovery period.  [facility] has all private rooms.    If you have any questions please call Pre-Admission Testing at [phone].  Deductibles and co-payments are collected on the day of service. Please be prepared to pay the required co-pay, deductible or deposit on the day of service as defined by your plan.    A RESPONSIBLE PERSON MUST REMAIN IN THE WAITING ROOM DURING YOUR PROCEDURE AND A RESPONSIBLE  MUST BE AVAILABLE UPON YOUR DISCHARGE.

## 2021-12-20 ENCOUNTER — LAB (OUTPATIENT)
Dept: UROLOGY | Facility: CLINIC | Age: 26
End: 2021-12-20

## 2021-12-20 ENCOUNTER — APPOINTMENT (OUTPATIENT)
Dept: GENERAL RADIOLOGY | Facility: HOSPITAL | Age: 26
End: 2021-12-20

## 2021-12-20 ENCOUNTER — HOSPITAL ENCOUNTER (OUTPATIENT)
Facility: HOSPITAL | Age: 26
Setting detail: HOSPITAL OUTPATIENT SURGERY
Discharge: HOME | End: 2021-12-20
Attending: UROLOGY | Admitting: UROLOGY

## 2021-12-20 DIAGNOSIS — N20.0 RENAL CALCULI: Primary | ICD-10-CM

## 2021-12-20 DIAGNOSIS — N20.0 RENAL CALCULI: ICD-10-CM

## 2021-12-20 PROCEDURE — 82365 CALCULUS SPECTROSCOPY: CPT | Performed by: UROLOGY

## 2021-12-20 PROCEDURE — 74018 RADEX ABDOMEN 1 VIEW: CPT

## 2021-12-27 LAB
CALCIUM OXALATE DIHYDRATE MFR STONE IR: 40 %
COLOR STONE: NORMAL
COM MFR STONE: 30 %
COMPN STONE: NORMAL
HYDROXYAPATITE 24H ENGDIFF UR: 30 %
LABORATORY COMMENT REPORT: NORMAL
Lab: NORMAL
Lab: NORMAL
PHOTO: NORMAL
SIZE STONE: NORMAL MM
SPEC SOURCE SUBJ: NORMAL
WT STONE: 118 MG

## 2023-11-26 NOTE — PLAN OF CARE
Problem: Patient Care Overview  Goal: Plan of Care Review  Outcome: Ongoing (interventions implemented as appropriate)  Flowsheets  Taken 6/5/2020 0306  Progress: improving  Outcome Summary: Pt has had minimal nausea tonight, has tolerated some po fluids and is receiving IV fluids. Pt has been resting well.  Taken 6/4/2020 2030  Plan of Care Reviewed With: patient  Goal: Individualization and Mutuality  Outcome: Ongoing (interventions implemented as appropriate)  Goal: Discharge Needs Assessment  Outcome: Ongoing (interventions implemented as appropriate)  Flowsheets (Taken 6/4/2020 1612 by MondayInés RN)  Concerns to be Addressed: no discharge needs identified  Readmission Within the Last 30 Days: no previous admission in last 30 days  Goal: Interprofessional Rounds/Family Conf  Outcome: Ongoing (interventions implemented as appropriate)  Flowsheets (Taken 6/5/2020 0306)  Participants: nursing; patient      -f/u AM lipid panel  -continue with home atorvastatin 20mg daily

## (undated) DEVICE — BG RESUSCITATOR INFANT BABYBLUE2

## (undated) DEVICE — SLV SCD CALF HEMOFORCE DVT THERP REPR LG

## (undated) DEVICE — PK C/SECT 70

## (undated) DEVICE — HYDROGEL COATED LATEX URINE METER FOLEY TRAY,16 FR/CH (5.3 MM), 5 ML CATHETER PRE-CONNECTED TO 2000 ML DRAINAGE BAG WITH NEEDLE SAMPLING: Brand: DOVER

## (undated) DEVICE — TRY SPINE PENCAN 24GA X4IN

## (undated) DEVICE — SKIN AFFIX SURG ADHESIVE 72/CS 0.55ML: Brand: MEDLINE

## (undated) DEVICE — CUFF SCD HEMOFORCE SEQ CALF STD MD

## (undated) DEVICE — APPL CHLORAPREP W/TINT 26ML ORNG